# Patient Record
Sex: FEMALE | Race: BLACK OR AFRICAN AMERICAN | Employment: FULL TIME | URBAN - METROPOLITAN AREA
[De-identification: names, ages, dates, MRNs, and addresses within clinical notes are randomized per-mention and may not be internally consistent; named-entity substitution may affect disease eponyms.]

---

## 2019-06-20 ENCOUNTER — HOSPITAL ENCOUNTER (OUTPATIENT)
Age: 24
Discharge: HOME | End: 2019-06-20

## 2019-06-20 LAB — RUBV IGG SER QL: 499 IU/ML

## 2019-06-20 PROCEDURE — 86481 TB AG RESPONSE T-CELL SUSP: CPT

## 2019-06-20 PROCEDURE — 86787 VARICELLA-ZOSTER ANTIBODY: CPT

## 2019-06-20 PROCEDURE — 86735 MUMPS ANTIBODY: CPT

## 2019-06-20 PROCEDURE — 86765 RUBEOLA ANTIBODY: CPT

## 2019-06-20 PROCEDURE — 86762 RUBELLA ANTIBODY: CPT

## 2019-06-21 LAB
MEASLES IMMUNE (IGG): 5.16
MUV IGG SER QL: 1.85
VZV IGG SER QL IA: 3.01

## 2019-06-25 LAB — T-SPOT TB TEST: NORMAL

## 2019-09-06 ENCOUNTER — NURSE TRIAGE (OUTPATIENT)
Dept: OTHER | Facility: CLINIC | Age: 24
End: 2019-09-06

## 2019-09-06 NOTE — TELEPHONE ENCOUNTER
Reason for Disposition   Patient wants to be seen    Protocols used: HEADACHE-ADULT-OH    Patient states she has a migraine. States she was previously diagnosed and was supposed to see neurology but has not. She does not have a PCP at this time and is new to [facility].   She started with headache on one side of head this morning but is progressively getting a little worse, pain 6/10. She has taken Acetaminophen , she is \"allergic\" to Ibuprofen. She is calling to find out if she can to [facility] in [place]. Discussed that urgent care is not in network. Discussed [facility] in clinics near her or the [facility] [address] urgent care as another option.

## 2019-09-25 ENCOUNTER — NURSE TRIAGE (OUTPATIENT)
Dept: OTHER | Facility: CLINIC | Age: 24
End: 2019-09-25

## 2019-12-20 ENCOUNTER — TELEPHONE (OUTPATIENT)
Dept: OBGYN CLINIC | Age: 24
End: 2019-12-20

## 2020-01-02 ENCOUNTER — HOSPITAL ENCOUNTER (OUTPATIENT)
Age: 25
Setting detail: SPECIMEN
Discharge: HOME | End: 2020-01-02
Payer: COMMERCIAL

## 2020-01-02 ENCOUNTER — INITIAL PRENATAL (OUTPATIENT)
Dept: OBGYN CLINIC | Age: 25
End: 2020-01-02

## 2020-01-02 VITALS
SYSTOLIC BLOOD PRESSURE: 125 MMHG | HEIGHT: 69 IN | BODY MASS INDEX: 30.96 KG/M2 | DIASTOLIC BLOOD PRESSURE: 74 MMHG | WEIGHT: 209 LBS | HEART RATE: 67 BPM

## 2020-01-02 PROBLEM — Z23 NEEDS FLU SHOT: Status: ACTIVE | Noted: 2020-01-02

## 2020-01-02 PROBLEM — O99.210 OBESITY AFFECTING PREGNANCY: Status: ACTIVE | Noted: 2020-01-02

## 2020-01-02 PROBLEM — O09.299 HISTORY OF MACROSOMIA IN INFANT IN PRIOR PREGNANCY, CURRENTLY PREGNANT: Status: ACTIVE | Noted: 2020-01-02

## 2020-01-02 PROBLEM — O09.90 HIGH-RISK PREGNANCY: Status: ACTIVE | Noted: 2020-01-02

## 2020-01-02 PROCEDURE — 0502F SUBSEQUENT PRENATAL CARE: CPT | Performed by: ADVANCED PRACTICE MIDWIFE

## 2020-01-02 RX ORDER — FAMOTIDINE 40 MG/1
40 TABLET, FILM COATED ORAL PRN
COMMUNITY
End: 2022-08-30

## 2020-01-02 NOTE — PROGRESS NOTES
[address],  [place] [address] [address] [facility] [address]  Dept: [phone]    New Obstetric Intake     Subjective:    Patient Fernanda Fletcher  Patient Age: 21 y.o. Date of Visit: 1/2/2020  Patient's estimated gestational age at visit: 18w1d      Objective:  /74   Pulse 67   Ht 5' 8.5\" (1.74 m)   Wt 209 lb (94.8 kg)   BMI 31.32 kg/m²   Body mass index is 31.32 kg/m². Physical Exam  Vitals signs reviewed. Constitutional:       General: She is not in acute distress. Appearance: She is well-developed. She is not diaphoretic. Neck:      Musculoskeletal: Normal range of motion. Thyroid: No thyromegaly. Cardiovascular:      Rate and Rhythm: Normal rate and regular rhythm. Pulmonary:      Effort: Pulmonary effort is normal. No respiratory distress. Breath sounds: Normal breath sounds. Musculoskeletal: Normal range of motion. Skin:     General: Skin is warm and dry. Neurological:      Mental Status: She is alert and oriented to person, place, and time. Psychiatric:         Behavior: Behavior normal.         Thought Content: Thought content normal.         Judgment: Judgment normal.         Mother's Ethnicity: -American  Father's Ethnicity: -American    [facility] Score: 15  History of postpartum depression: no    Assessment/Plan:    1. High-risk pregnancy in second trimester  - [facility] referral for anatomy scan sent. - I agree with EPDS patient appears to not be accepting of the pregnancy. Stating her children do not know, saying she did tell them then said 'just joking'. Reports significant other is not happy nor upset about the pregnancy. Patient exhibited NO suicidal/ homicidal ideations. Reviewed patient does not desire further pregnancies but has tried multiple methods of contraception without success. Patient reports she does not know what she is going to do after this pregnancy.  Will continue to monitor closely in the pregnancy  - Jean Pierre Jesus MD, Maternal Fetal Medicine, [facility]    2. 18 weeks gestation of pregnancy   I have reviewed the RN OB intake visit and information   The patient was then seen and a full history was reviewed and completed. As was the above documented Physical Exam   After review of information, I initiated the Problem List    The patient was counseled on drug screening, HIV, Cystic Fibrosis, SMA, FX, and Sickle Cell disease, as appropriate.   o She verbally consented to HIV testing and drug screening.  The patient was informed of a 2-4% risk of congenital anomalies in the general population. The patient was questioned in detail regarding any genetic misnomer history, chromosomal abnormalities, or learning disabilities in herself, the father of the baby or their families. She denied any history as stated above: Yes   Nuchal Translucency/Quad Screen and/or Single Marker MSAFP testing was reviewed with attention to timing.  She declined genetic testing.  Route of delivery and counseling on vaginal, operative vaginal, and  sections were discussed. Further counseling will be handled by the provider at subsequent visits.  The patient was informed that [facility] only delivers at  [facility] and is agreeable to delivery at [facility].  She was made aware of the Midwife Philosophy against Elective Induction. 3. Vaginal discharge  - To f/up with results   - Vaginitis DNA Probe; Future  - Urine Culture; Future  - C.trachomatis N.gonorrhoeae DNA; Future        ROS was done and is negative except as documented in HPI. History was reviewed as documented on Epic Navigator. Patient was seen with total face to face time of 25 minutes. More than 50% of this visit was on counseling and education regarding her diagnoses and her options.  She was also counseled on her preventative health maintenance recommendations and

## 2020-01-03 ENCOUNTER — TELEPHONE (OUTPATIENT)
Dept: OBGYN CLINIC | Age: 25
End: 2020-01-03

## 2020-01-03 PROBLEM — Z91.89 AT RISK FOR DEPRESSION: Status: ACTIVE | Noted: 2020-01-03

## 2020-01-03 LAB
C TRACH DNA GENITAL QL NAA+PROBE: NEGATIVE
CULTURE: NORMAL
DIRECT EXAM: ABNORMAL
Lab: ABNORMAL
Lab: NORMAL
N. GONORRHOEAE DNA: NEGATIVE
SPECIMEN DESCRIPTION: ABNORMAL
SPECIMEN DESCRIPTION: NORMAL
SPECIMEN DESCRIPTION: NORMAL

## 2020-01-03 RX ORDER — METRONIDAZOLE 500 MG/1
500 TABLET ORAL 2 TIMES DAILY
Qty: 14 TABLET | Refills: 0 | Status: SHIPPED | OUTPATIENT
Start: 2020-01-03 | End: 2020-01-10

## 2020-01-16 ENCOUNTER — ROUTINE PRENATAL (OUTPATIENT)
Dept: OBGYN CLINIC | Age: 25
End: 2020-01-16

## 2020-01-16 VITALS
HEART RATE: 72 BPM | BODY MASS INDEX: 32.28 KG/M2 | SYSTOLIC BLOOD PRESSURE: 126 MMHG | WEIGHT: 215.4 LBS | DIASTOLIC BLOOD PRESSURE: 78 MMHG

## 2020-01-16 PROBLEM — F41.1 GAD (GENERALIZED ANXIETY DISORDER): Status: ACTIVE | Noted: 2020-01-16

## 2020-01-16 PROCEDURE — 0502F SUBSEQUENT PRENATAL CARE: CPT | Performed by: ADVANCED PRACTICE MIDWIFE

## 2020-01-16 RX ORDER — OMEPRAZOLE 20 MG/1
20 CAPSULE, DELAYED RELEASE ORAL DAILY
Qty: 60 CAPSULE | Refills: 6 | Status: SHIPPED | OUTPATIENT
Start: 2020-01-16 | End: 2020-07-01

## 2020-01-16 ASSESSMENT — PATIENT HEALTH QUESTIONNAIRE - PHQ9
2. FEELING DOWN, DEPRESSED OR HOPELESS: 0
SUM OF ALL RESPONSES TO PHQ9 QUESTIONS 1 & 2: 0
1. LITTLE INTEREST OR PLEASURE IN DOING THINGS: 0
SUM OF ALL RESPONSES TO PHQ QUESTIONS 1-9: 0
SUM OF ALL RESPONSES TO PHQ QUESTIONS 1-9: 0

## 2020-01-29 ENCOUNTER — ROUTINE PRENATAL (OUTPATIENT)
Dept: PERINATAL CARE | Age: 25
End: 2020-01-29
Payer: COMMERCIAL

## 2020-01-29 VITALS
SYSTOLIC BLOOD PRESSURE: 117 MMHG | HEIGHT: 69 IN | TEMPERATURE: 98.9 F | RESPIRATION RATE: 16 BRPM | DIASTOLIC BLOOD PRESSURE: 69 MMHG | BODY MASS INDEX: 32.73 KG/M2 | HEART RATE: 83 BPM | WEIGHT: 221 LBS

## 2020-01-29 PROCEDURE — 76811 OB US DETAILED SNGL FETUS: CPT | Performed by: OBSTETRICS & GYNECOLOGY

## 2020-01-29 PROCEDURE — 76817 TRANSVAGINAL US OBSTETRIC: CPT | Performed by: OBSTETRICS & GYNECOLOGY

## 2020-01-29 PROCEDURE — 99999 PR OFFICE/OUTPT VISIT,PROCEDURE ONLY: CPT | Performed by: OBSTETRICS & GYNECOLOGY

## 2020-01-29 RX ORDER — ACETAMINOPHEN 325 MG/1
650 TABLET ORAL EVERY 6 HOURS PRN
COMMUNITY

## 2020-02-18 ENCOUNTER — HOSPITAL ENCOUNTER (OUTPATIENT)
Age: 25
Setting detail: SPECIMEN
Discharge: HOME | End: 2020-02-18
Payer: COMMERCIAL

## 2020-02-18 ENCOUNTER — ROUTINE PRENATAL (OUTPATIENT)
Dept: OBGYN CLINIC | Age: 25
End: 2020-02-18

## 2020-02-18 VITALS
HEART RATE: 109 BPM | WEIGHT: 227 LBS | BODY MASS INDEX: 33.52 KG/M2 | SYSTOLIC BLOOD PRESSURE: 137 MMHG | DIASTOLIC BLOOD PRESSURE: 76 MMHG

## 2020-02-18 PROBLEM — O99.810 ABNORMAL GLUCOSE AFFECTING PREGNANCY: Status: ACTIVE | Noted: 2020-02-18

## 2020-02-18 LAB
ABSOLUTE EOS #: <0.03 K/UL (ref 0–0.44)
ABSOLUTE IMMATURE GRANULOCYTE: 0.05 K/UL (ref 0–0.3)
ABSOLUTE LYMPH #: 1.94 K/UL (ref 1.1–3.7)
ABSOLUTE MONO #: 0.39 K/UL (ref 0.1–1.2)
BASOPHILS # BLD: 0 % (ref 0–2)
BASOPHILS ABSOLUTE: <0.03 K/UL (ref 0–0.2)
BILIRUBIN, POC: NORMAL
BLOOD URINE, POC: NEGATIVE
CLARITY, POC: CLEAR
COLOR, POC: CLEAR
DIFFERENTIAL TYPE: ABNORMAL
DIRECT EXAM: NORMAL
EOSINOPHILS RELATIVE PERCENT: 0 % (ref 1–4)
GLUCOSE ADMINISTRATION: ABNORMAL
GLUCOSE TOLERANCE SCREEN 50G: 150 MG/DL (ref 70–135)
GLUCOSE URINE, POC: NORMAL
HCT VFR BLD CALC: 34.4 % (ref 36.3–47.1)
HEMOGLOBIN: 10.7 G/DL (ref 11.9–15.1)
IMMATURE GRANULOCYTES: 1 %
KETONES, POC: NEGATIVE
LEUKOCYTE EST, POC: NEGATIVE
LYMPHOCYTES # BLD: 23 % (ref 24–43)
Lab: NORMAL
MCH RBC QN AUTO: 26.5 PG (ref 25.2–33.5)
MCHC RBC AUTO-ENTMCNC: 31.1 G/DL (ref 28.4–34.8)
MCV RBC AUTO: 85.1 FL (ref 82.6–102.9)
MONOCYTES # BLD: 5 % (ref 3–12)
NITRITE, POC: NEGATIVE
NRBC AUTOMATED: 0 PER 100 WBC
PDW BLD-RTO: 15.1 % (ref 11.8–14.4)
PH, POC: 7
PLATELET # BLD: 287 K/UL (ref 138–453)
PLATELET ESTIMATE: ABNORMAL
PMV BLD AUTO: 9.7 FL (ref 8.1–13.5)
PROTEIN, POC: NEGATIVE
RBC # BLD: 4.04 M/UL (ref 3.95–5.11)
RBC # BLD: ABNORMAL 10*6/UL
SEG NEUTROPHILS: 71 % (ref 36–65)
SEGMENTED NEUTROPHILS ABSOLUTE COUNT: 6.17 K/UL (ref 1.5–8.1)
SPECIFIC GRAVITY, POC: 1
SPECIMEN DESCRIPTION: NORMAL
UROBILINOGEN, POC: NORMAL
WBC # BLD: 8.6 K/UL (ref 3.5–11.3)
WBC # BLD: ABNORMAL 10*3/UL

## 2020-02-18 PROCEDURE — 0502F SUBSEQUENT PRENATAL CARE: CPT | Performed by: ADVANCED PRACTICE MIDWIFE

## 2020-02-19 LAB
C TRACH DNA GENITAL QL NAA+PROBE: NEGATIVE
CULTURE: NORMAL
Lab: NORMAL
N. GONORRHOEAE DNA: NEGATIVE
SPECIMEN DESCRIPTION: NORMAL
SPECIMEN DESCRIPTION: NORMAL

## 2020-02-20 ENCOUNTER — HOSPITAL ENCOUNTER (OUTPATIENT)
Age: 25
Setting detail: SPECIMEN
Discharge: HOME | End: 2020-02-20
Payer: COMMERCIAL

## 2020-02-20 ENCOUNTER — TELEPHONE (OUTPATIENT)
Dept: OBGYN CLINIC | Age: 25
End: 2020-02-20

## 2020-02-20 LAB
3 HR GLUCOSE: 80 MG/DL (ref 65–139)
AMOUNT GLUCOSE GIVEN: 100 G
GLUCOSE FASTING: 78 MG/DL (ref 65–94)
GLUCOSE TOLERANCE TEST 1 HOUR: 85 MG/DL (ref 65–179)
GLUCOSE TOLERANCE TEST 2 HOUR: 84 MG/DL (ref 65–154)

## 2020-03-11 ENCOUNTER — ROUTINE PRENATAL (OUTPATIENT)
Dept: PERINATAL CARE | Age: 25
End: 2020-03-11
Payer: COMMERCIAL

## 2020-03-11 VITALS
HEIGHT: 69 IN | DIASTOLIC BLOOD PRESSURE: 65 MMHG | SYSTOLIC BLOOD PRESSURE: 111 MMHG | HEART RATE: 86 BPM | TEMPERATURE: 98.8 F | WEIGHT: 238.1 LBS | BODY MASS INDEX: 35.27 KG/M2 | RESPIRATION RATE: 16 BRPM

## 2020-03-11 PROCEDURE — 76827 ECHO EXAM OF FETAL HEART: CPT | Performed by: OBSTETRICS & GYNECOLOGY

## 2020-03-11 PROCEDURE — 76825 ECHO EXAM OF FETAL HEART: CPT | Performed by: OBSTETRICS & GYNECOLOGY

## 2020-03-11 PROCEDURE — 76816 OB US FOLLOW-UP PER FETUS: CPT | Performed by: OBSTETRICS & GYNECOLOGY

## 2020-03-11 PROCEDURE — 76819 FETAL BIOPHYS PROFIL W/O NST: CPT | Performed by: OBSTETRICS & GYNECOLOGY

## 2020-03-11 PROCEDURE — 93325 DOPPLER ECHO COLOR FLOW MAPG: CPT | Performed by: OBSTETRICS & GYNECOLOGY

## 2020-03-17 ENCOUNTER — ROUTINE PRENATAL (OUTPATIENT)
Dept: OBGYN CLINIC | Age: 25
End: 2020-03-17
Payer: COMMERCIAL

## 2020-03-17 VITALS
WEIGHT: 235.2 LBS | BODY MASS INDEX: 34.73 KG/M2 | HEART RATE: 85 BPM | SYSTOLIC BLOOD PRESSURE: 127 MMHG | DIASTOLIC BLOOD PRESSURE: 74 MMHG

## 2020-03-17 PROCEDURE — 90471 IMMUNIZATION ADMIN: CPT | Performed by: ADVANCED PRACTICE MIDWIFE

## 2020-03-17 PROCEDURE — 90715 TDAP VACCINE 7 YRS/> IM: CPT | Performed by: ADVANCED PRACTICE MIDWIFE

## 2020-03-17 PROCEDURE — 0502F SUBSEQUENT PRENATAL CARE: CPT | Performed by: ADVANCED PRACTICE MIDWIFE

## 2020-03-17 NOTE — PROGRESS NOTES
After obtaining consent, and per orders of Kem Homans, CNM, injection of Tdap given in Left deltoid by Shree Alert. Patient instructed to remain in clinic for 20 minutes afterwards, and to report any adverse reaction to me immediately.

## 2020-03-25 ENCOUNTER — TELEPHONE (OUTPATIENT)
Dept: OBGYN CLINIC | Age: 25
End: 2020-03-25

## 2020-03-25 ENCOUNTER — TELEPHONE (OUTPATIENT)
Dept: PEDIATRICS CLINIC | Age: 25
End: 2020-03-25

## 2020-03-25 NOTE — TELEPHONE ENCOUNTER
PC from the patient stating she need to have an end date for her FMLA for the light duty. Please advise.

## 2020-03-25 NOTE — LETTER
[facility]) [facility] and Gynecology   [address] [place] [address]  Phone: [phone]  Fax: [phone]    Cheryle Marc, APRN - CNM        March 25, 2020     Patient: Geovanna Rooney   YOB: 1995   Date of Visit: 3/25/2020       To Whom it May Concern: It is my medical opinion that Geovanna Rooney may return to work on light duty (lifting restriction of 15 pounds) for the remainder of her pregnancy. Her estimated due date is 06/03/2020. If you have any questions or concerns, please don't hesitate to call.     Sincerely,         Cheryle Marc, APRN - CNM

## 2020-04-02 ENCOUNTER — TELEPHONE (OUTPATIENT)
Dept: OBGYN CLINIC | Age: 25
End: 2020-04-02

## 2020-04-02 ENCOUNTER — ROUTINE PRENATAL (OUTPATIENT)
Dept: OBGYN CLINIC | Age: 25
End: 2020-04-02

## 2020-04-02 VITALS
SYSTOLIC BLOOD PRESSURE: 137 MMHG | WEIGHT: 239 LBS | HEART RATE: 101 BPM | DIASTOLIC BLOOD PRESSURE: 76 MMHG | BODY MASS INDEX: 35.29 KG/M2

## 2020-04-02 PROBLEM — Z23 NEED FOR TDAP VACCINATION: Status: ACTIVE | Noted: 2020-04-02

## 2020-04-02 PROCEDURE — 0502F SUBSEQUENT PRENATAL CARE: CPT | Performed by: ADVANCED PRACTICE MIDWIFE

## 2020-04-02 NOTE — TELEPHONE ENCOUNTER
ROXANNA for pt to call back to schedule appt Virtual visit  4-23-20 @34w,and May7 @36w at our [facility]

## 2020-04-06 ENCOUNTER — TELEPHONE (OUTPATIENT)
Dept: OBGYN | Age: 25
End: 2020-04-06

## 2020-04-06 ENCOUNTER — TELEPHONE (OUTPATIENT)
Dept: OBGYN CLINIC | Age: 25
End: 2020-04-06

## 2020-04-17 ENCOUNTER — TELEPHONE (OUTPATIENT)
Dept: OBGYN CLINIC | Age: 25
End: 2020-04-17

## 2020-04-17 NOTE — TELEPHONE ENCOUNTER
Patient does not understand why she has to wait so late into the pregnancy. She stated she was told it was to see if she was going to be able to deliver vaginally or have to have a . Patient states that she needs to get it scheduled now she states because she always delivers early. I let her know that we an put in the referral at 35-36 weeks and schedule it for her then.

## 2020-04-17 NOTE — TELEPHONE ENCOUNTER
Patient called and stated that a referral for a check on baby's size was supposed to be put in son she could get a ultrasound. She was told by margy that a referral was getting put in she said.      I do not see a referral for ultrasound

## 2020-04-27 ENCOUNTER — HOSPITAL ENCOUNTER (OUTPATIENT)
Age: 25
Setting detail: SPECIMEN
Discharge: HOME | End: 2020-04-27
Payer: COMMERCIAL

## 2020-04-27 ENCOUNTER — ROUTINE PRENATAL (OUTPATIENT)
Dept: OBGYN CLINIC | Age: 25
End: 2020-04-27

## 2020-04-27 VITALS
BODY MASS INDEX: 36.62 KG/M2 | DIASTOLIC BLOOD PRESSURE: 79 MMHG | WEIGHT: 248 LBS | SYSTOLIC BLOOD PRESSURE: 135 MMHG | HEART RATE: 94 BPM | TEMPERATURE: 97.7 F

## 2020-04-27 LAB
ALBUMIN SERPL-MCNC: 3.5 G/DL (ref 3.5–5.2)
ALBUMIN/GLOBULIN RATIO: 1.2 (ref 1–2.5)
ALP BLD-CCNC: 90 U/L (ref 35–104)
ALT SERPL-CCNC: 15 U/L (ref 5–33)
ANION GAP SERPL CALCULATED.3IONS-SCNC: 16 MMOL/L (ref 9–17)
AST SERPL-CCNC: 18 U/L
BILIRUB SERPL-MCNC: 0.22 MG/DL (ref 0.3–1.2)
BUN BLDV-MCNC: 3 MG/DL (ref 6–20)
BUN/CREAT BLD: ABNORMAL (ref 9–20)
CALCIUM SERPL-MCNC: 9.7 MG/DL (ref 8.6–10.4)
CHLORIDE BLD-SCNC: 100 MMOL/L (ref 98–107)
CO2: 22 MMOL/L (ref 20–31)
CREAT SERPL-MCNC: 0.35 MG/DL (ref 0.5–0.9)
CREATININE URINE: 42.5 MG/DL (ref 28–217)
GFR AFRICAN AMERICAN: >60 ML/MIN
GFR NON-AFRICAN AMERICAN: >60 ML/MIN
GFR SERPL CREATININE-BSD FRML MDRD: ABNORMAL ML/MIN/{1.73_M2}
GFR SERPL CREATININE-BSD FRML MDRD: ABNORMAL ML/MIN/{1.73_M2}
GLUCOSE BLD-MCNC: 104 MG/DL (ref 70–99)
POTASSIUM SERPL-SCNC: 4.2 MMOL/L (ref 3.7–5.3)
SODIUM BLD-SCNC: 138 MMOL/L (ref 135–144)
TOTAL PROTEIN, URINE: 8 MG/DL
TOTAL PROTEIN: 6.5 G/DL (ref 6.4–8.3)
URIC ACID: 4.8 MG/DL (ref 2.4–5.7)
URINE TOTAL PROTEIN CREATININE RATIO: 0.19 (ref 0–0.2)

## 2020-04-27 PROCEDURE — 0502F SUBSEQUENT PRENATAL CARE: CPT | Performed by: ADVANCED PRACTICE MIDWIFE

## 2020-04-27 NOTE — PROGRESS NOTES
SUBJECTIVE:    Monica Negro is here for her routine OB visit. She reports  fetal movement. She denies  vaginal bleeding. She denies  leaking of fluid. She denies  vaginal discharge. She reports some  uterine contraction activity. She denies  nausea and/or vomiting. She denies retaining fluid in her extremities. Here today, not feeling well. Nauseated this am and vomited once, tired. Seeing floaters. Watches cardiac monitors by telehealth. Also c/o headaches. States her B/P has been elevated at home 140s/80s  Labs drawn for PE baseline. Order given for growth ultrasound for 36 weeks. OBJECTIVE:   Blood pressure 135/79, pulse 94, temperature 97.7 °F (36.5 °C), temperature source Oral, weight 248 lb (112.5 kg). ASSESSMENT:  IUP @ 34 weeks  S = D      PLAN:   Monica Negro will monitor for fetal movements daily. The problem list was reviewed and updated as needed. GBS protocol and testing was not discussed. GBS culture was not obtained. Results were not reviewed. Signs of labor to report were discussed. Birth preferences were discussed. Monica Negro was not counseled regarding Post Partum Depression. She has not decided on contraceptive choice. Monica Negro was counseled regarding cHTN and S? S of PE     More than 50% of this 20 min visit was on counseling and education.

## 2020-04-28 ENCOUNTER — ROUTINE PRENATAL (OUTPATIENT)
Dept: PERINATAL CARE | Age: 25
End: 2020-04-28
Payer: COMMERCIAL

## 2020-04-28 VITALS
HEART RATE: 92 BPM | HEIGHT: 69 IN | WEIGHT: 249 LBS | DIASTOLIC BLOOD PRESSURE: 68 MMHG | RESPIRATION RATE: 16 BRPM | BODY MASS INDEX: 36.88 KG/M2 | TEMPERATURE: 98.4 F | SYSTOLIC BLOOD PRESSURE: 124 MMHG

## 2020-04-28 PROCEDURE — 76819 FETAL BIOPHYS PROFIL W/O NST: CPT | Performed by: OBSTETRICS & GYNECOLOGY

## 2020-04-28 PROCEDURE — 76805 OB US >/= 14 WKS SNGL FETUS: CPT | Performed by: OBSTETRICS & GYNECOLOGY

## 2020-05-04 ENCOUNTER — HOSPITAL ENCOUNTER (OUTPATIENT)
Age: 25
Setting detail: SPECIMEN
Discharge: HOME | End: 2020-05-04
Payer: COMMERCIAL

## 2020-05-04 ENCOUNTER — ROUTINE PRENATAL (OUTPATIENT)
Dept: OBGYN CLINIC | Age: 25
End: 2020-05-04

## 2020-05-04 VITALS — BODY MASS INDEX: 37.51 KG/M2 | DIASTOLIC BLOOD PRESSURE: 78 MMHG | WEIGHT: 254 LBS | SYSTOLIC BLOOD PRESSURE: 128 MMHG

## 2020-05-04 LAB
DIRECT EXAM: ABNORMAL
HCT VFR BLD CALC: 34.4 % (ref 36.3–47.1)
HEMOGLOBIN: 10.2 G/DL (ref 11.9–15.1)
Lab: ABNORMAL
MCH RBC QN AUTO: 26.5 PG (ref 25.2–33.5)
MCHC RBC AUTO-ENTMCNC: 29.7 G/DL (ref 28.4–34.8)
MCV RBC AUTO: 89.4 FL (ref 82.6–102.9)
NRBC AUTOMATED: 0 PER 100 WBC
PDW BLD-RTO: 14.8 % (ref 11.8–14.4)
PLATELET # BLD: 164 K/UL (ref 138–453)
PMV BLD AUTO: 10.2 FL (ref 8.1–13.5)
RBC # BLD: 3.85 M/UL (ref 3.95–5.11)
SPECIMEN DESCRIPTION: ABNORMAL
WBC # BLD: 6.5 K/UL (ref 3.5–11.3)

## 2020-05-04 PROCEDURE — 0502F SUBSEQUENT PRENATAL CARE: CPT | Performed by: ADVANCED PRACTICE MIDWIFE

## 2020-05-04 NOTE — PROGRESS NOTES
SUBJECTIVE:    Lito Arvizu is here for her routine OB visit. She is doing well but is concerned with big babies. She is only one in family with big babies but FOB was macrosomic baby also. Denies having any issues with deliveries. She reports  fetal movement. She denies  vaginal bleeding. She denies  leaking of fluid. She reports vaginal discharge. She denies  uterine contraction activity. She denies  nausea and/or vomiting. She denies retaining fluid in her extremities. OBJECTIVE:   Blood pressure 128/78, weight 254 lb (115.2 kg), last menstrual period 08/17/2019. GBS and Affirm self-collected      ASSESSMENT/PLAN:  1. 35 weeks gestation of pregnancy  S>D      2. High-risk pregnancy in third trimester  The problem list was reviewed and updated as needed. Lito Arvizu will monitor for fetal movements daily    GBS protocol and testing was discussed. GBS culture was obtained. Signs of labor to report were discussed. Birth preferences were discussed. Off work started    Lito Arvizu was counseled regarding all of the above    3. History of macrosomia in infant in prior pregnancy, currently pregnant  Reassurance for delivery given, as she has delivered macrosomic babies prior    4. Vaginal discharge  - Vaginitis DNA Probe; Future    5. Anemia due to other cause, not classified  - CBC; Future      Patient was seen with total faced to face time of 15 minutes. More than 50% of this visit was on counseling and education.

## 2020-05-04 NOTE — LETTER
[facility] and Gynecology   [address] [place] [address] Drive [address] [place] [address]  Phone: [phone]  Fax: [phone]    BRENDAN Naylor CNM        May 4, 2020    [address]  [place] [address]      To Whom It May Concern:    Theo Escobar (1995) was seen in our clinic today. It is my medical recommendation that she be off work for the duration of her pregnancy starting 05/04/2020. If you have any questions or concerns, please don't hesitate to call.     Sincerely,        BRENDAN Naylor CNM

## 2020-05-06 PROBLEM — O99.820 GBS (GROUP B STREPTOCOCCUS CARRIER), +RV CULTURE, CURRENTLY PREGNANT: Status: ACTIVE | Noted: 2020-05-06

## 2020-05-06 LAB
CULTURE: ABNORMAL
Lab: ABNORMAL
SPECIMEN DESCRIPTION: ABNORMAL

## 2020-05-06 RX ORDER — METRONIDAZOLE 500 MG/1
500 TABLET ORAL 2 TIMES DAILY
Qty: 14 TABLET | Refills: 0 | Status: SHIPPED | OUTPATIENT
Start: 2020-05-06 | End: 2020-05-13

## 2020-05-11 ENCOUNTER — ROUTINE PRENATAL (OUTPATIENT)
Dept: OBGYN CLINIC | Age: 25
End: 2020-05-11

## 2020-05-11 VITALS
BODY MASS INDEX: 38.99 KG/M2 | HEART RATE: 92 BPM | SYSTOLIC BLOOD PRESSURE: 140 MMHG | WEIGHT: 264 LBS | DIASTOLIC BLOOD PRESSURE: 89 MMHG

## 2020-05-11 PROCEDURE — 0502F SUBSEQUENT PRENATAL CARE: CPT | Performed by: ADVANCED PRACTICE MIDWIFE

## 2020-05-18 ENCOUNTER — ROUTINE PRENATAL (OUTPATIENT)
Dept: OBGYN CLINIC | Age: 25
End: 2020-05-18

## 2020-05-18 VITALS
HEART RATE: 90 BPM | WEIGHT: 276 LBS | DIASTOLIC BLOOD PRESSURE: 80 MMHG | BODY MASS INDEX: 40.76 KG/M2 | SYSTOLIC BLOOD PRESSURE: 124 MMHG

## 2020-05-18 PROCEDURE — 0502F SUBSEQUENT PRENATAL CARE: CPT | Performed by: ADVANCED PRACTICE MIDWIFE

## 2020-05-19 ENCOUNTER — HOSPITAL ENCOUNTER (INPATIENT)
Age: 25
LOS: 2 days | Discharge: HOME | End: 2020-05-21
Attending: OBSTETRICS & GYNECOLOGY | Admitting: OBSTETRICS & GYNECOLOGY
Payer: COMMERCIAL

## 2020-05-19 ENCOUNTER — ANESTHESIA EVENT (OUTPATIENT)
Dept: LABOR AND DELIVERY | Age: 25
End: 2020-05-19
Payer: COMMERCIAL

## 2020-05-19 ENCOUNTER — ANESTHESIA (OUTPATIENT)
Dept: LABOR AND DELIVERY | Age: 25
End: 2020-05-19
Payer: COMMERCIAL

## 2020-05-19 PROBLEM — O47.9 UTERINE CONTRACTIONS DURING PREGNANCY: Status: ACTIVE | Noted: 2020-05-19

## 2020-05-19 PROBLEM — D64.9 ANEMIA: Status: ACTIVE | Noted: 2020-05-19

## 2020-05-19 PROBLEM — O09.891 MATERNAL EXPOSURE TO ALCOHOL IN FIRST TRIMESTER: Status: ACTIVE | Noted: 2020-05-19

## 2020-05-19 PROBLEM — O14.90 PREECLAMPSIA: Status: ACTIVE | Noted: 2020-05-19

## 2020-05-19 PROBLEM — Z37.9 VACUUM-ASSISTED VAGINAL DELIVERY: Status: ACTIVE | Noted: 2020-05-19

## 2020-05-19 PROBLEM — Z3A.37 37 WEEKS GESTATION OF PREGNANCY: Status: ACTIVE | Noted: 2020-05-19

## 2020-05-19 PROBLEM — Z67.90 RH(D) POSITIVE: Status: ACTIVE | Noted: 2020-05-19

## 2020-05-19 LAB
-: NORMAL
ABO/RH: NORMAL
ABSOLUTE EOS #: 0 K/UL (ref 0–0.4)
ABSOLUTE IMMATURE GRANULOCYTE: 0 K/UL (ref 0–0.3)
ABSOLUTE LYMPH #: 2.03 K/UL (ref 1–4.8)
ABSOLUTE MONO #: 0.16 K/UL (ref 0.1–0.8)
ALBUMIN SERPL-MCNC: 3 G/DL (ref 3.5–5.2)
ALBUMIN/GLOBULIN RATIO: 1 (ref 1–2.5)
ALP BLD-CCNC: 98 U/L (ref 35–104)
ALT SERPL-CCNC: 16 U/L (ref 5–33)
AMORPHOUS: NORMAL
AMPHETAMINE SCREEN URINE: NEGATIVE
ANION GAP SERPL CALCULATED.3IONS-SCNC: 15 MMOL/L (ref 9–17)
ANTIBODY SCREEN: NEGATIVE
ARM BAND NUMBER: NORMAL
AST SERPL-CCNC: 26 U/L
BACTERIA: NORMAL
BARBITURATE SCREEN URINE: NEGATIVE
BASOPHILS # BLD: 0 % (ref 0–2)
BASOPHILS ABSOLUTE: 0 K/UL (ref 0–0.2)
BENZODIAZEPINE SCREEN, URINE: NEGATIVE
BILIRUB SERPL-MCNC: 0.28 MG/DL (ref 0.3–1.2)
BILIRUBIN URINE: NEGATIVE
BUN BLDV-MCNC: 4 MG/DL (ref 6–20)
BUN/CREAT BLD: ABNORMAL (ref 9–20)
BUPRENORPHINE URINE: NORMAL
CALCIUM SERPL-MCNC: 9 MG/DL (ref 8.6–10.4)
CANNABINOID SCREEN URINE: NEGATIVE
CASTS UA: NORMAL /LPF (ref 0–8)
CHLORIDE BLD-SCNC: 105 MMOL/L (ref 98–107)
CO2: 18 MMOL/L (ref 20–31)
COCAINE METABOLITE, URINE: NEGATIVE
COLOR: YELLOW
COMMENT UA: ABNORMAL
CREAT SERPL-MCNC: 0.42 MG/DL (ref 0.5–0.9)
CREATININE URINE: 72.3 MG/DL (ref 28–217)
CRYSTALS, UA: NORMAL /HPF
DIFFERENTIAL TYPE: ABNORMAL
EOSINOPHILS RELATIVE PERCENT: 0 % (ref 1–4)
EPITHELIAL CELLS UA: NORMAL /HPF (ref 0–5)
EXPIRATION DATE: NORMAL
GFR AFRICAN AMERICAN: >60 ML/MIN
GFR NON-AFRICAN AMERICAN: >60 ML/MIN
GFR SERPL CREATININE-BSD FRML MDRD: ABNORMAL ML/MIN/{1.73_M2}
GFR SERPL CREATININE-BSD FRML MDRD: ABNORMAL ML/MIN/{1.73_M2}
GLUCOSE BLD-MCNC: 111 MG/DL (ref 70–99)
GLUCOSE URINE: NEGATIVE
HCT VFR BLD CALC: 30.7 % (ref 36.3–47.1)
HEMOGLOBIN: 9.5 G/DL (ref 11.9–15.1)
IMMATURE GRANULOCYTES: 0 %
KETONES, URINE: NEGATIVE
LEUKOCYTE ESTERASE, URINE: NEGATIVE
LYMPHOCYTES # BLD: 26 % (ref 24–44)
MAGNESIUM: 4 MG/DL (ref 1.6–2.6)
MCH RBC QN AUTO: 24.5 PG (ref 25.2–33.5)
MCHC RBC AUTO-ENTMCNC: 30.9 G/DL (ref 28.4–34.8)
MCV RBC AUTO: 79.3 FL (ref 82.6–102.9)
MDMA URINE: NORMAL
METHADONE SCREEN, URINE: NEGATIVE
METHAMPHETAMINE, URINE: NORMAL
MONOCYTES # BLD: 2 % (ref 1–7)
MORPHOLOGY: ABNORMAL
MORPHOLOGY: ABNORMAL
MUCUS: NORMAL
NITRITE, URINE: NEGATIVE
NRBC AUTOMATED: 0.5 PER 100 WBC
NUCLEATED RED BLOOD CELLS: 1 PER 100 WBC
OPIATES, URINE: NEGATIVE
OTHER OBSERVATIONS UA: NORMAL
OXYCODONE SCREEN URINE: NEGATIVE
PDW BLD-RTO: 14.1 % (ref 11.8–14.4)
PH UA: 7 (ref 5–8)
PHENCYCLIDINE, URINE: NEGATIVE
PLATELET # BLD: 192 K/UL (ref 138–453)
PLATELET ESTIMATE: ABNORMAL
PMV BLD AUTO: 9.7 FL (ref 8.1–13.5)
POTASSIUM SERPL-SCNC: 3.7 MMOL/L (ref 3.7–5.3)
PROPOXYPHENE, URINE: NORMAL
PROTEIN UA: ABNORMAL
RBC # BLD: 3.87 M/UL (ref 3.95–5.11)
RBC # BLD: ABNORMAL 10*6/UL
RBC UA: NORMAL /HPF (ref 0–4)
RENAL EPITHELIAL, UA: NORMAL /HPF
SARS-COV-2, PCR: NORMAL
SARS-COV-2, RAPID: NOT DETECTED
SARS-COV-2: NORMAL
SEG NEUTROPHILS: 72 % (ref 36–66)
SEGMENTED NEUTROPHILS ABSOLUTE COUNT: 5.61 K/UL (ref 1.8–7.7)
SODIUM BLD-SCNC: 138 MMOL/L (ref 135–144)
SOURCE: NORMAL
SPECIFIC GRAVITY UA: 1.01 (ref 1–1.03)
T. PALLIDUM, IGG: NONREACTIVE
TEST INFORMATION: NORMAL
TOTAL PROTEIN, URINE: 53 MG/DL
TOTAL PROTEIN: 6 G/DL (ref 6.4–8.3)
TRICHOMONAS: NORMAL
TRICYCLIC ANTIDEPRESSANTS, UR: NORMAL
TURBIDITY: CLEAR
URINE HGB: NEGATIVE
URINE TOTAL PROTEIN CREATININE RATIO: 0.73 (ref 0–0.2)
UROBILINOGEN, URINE: NORMAL
WBC # BLD: 7.8 K/UL (ref 3.5–11.3)
WBC # BLD: ABNORMAL 10*3/UL
WBC UA: NORMAL /HPF (ref 0–5)
YEAST: NORMAL

## 2020-05-19 PROCEDURE — 6360000002 HC RX W HCPCS: Performed by: STUDENT IN AN ORGANIZED HEALTH CARE EDUCATION/TRAINING PROGRAM

## 2020-05-19 PROCEDURE — 1220000000 HC SEMI PRIVATE OB R&B

## 2020-05-19 PROCEDURE — 2500000003 HC RX 250 WO HCPCS: Performed by: NURSE ANESTHETIST, CERTIFIED REGISTERED

## 2020-05-19 PROCEDURE — 85025 COMPLETE CBC W/AUTO DIFF WBC: CPT

## 2020-05-19 PROCEDURE — 2500000003 HC RX 250 WO HCPCS: Performed by: STUDENT IN AN ORGANIZED HEALTH CARE EDUCATION/TRAINING PROGRAM

## 2020-05-19 PROCEDURE — 51701 INSERT BLADDER CATHETER: CPT

## 2020-05-19 PROCEDURE — 2580000003 HC RX 258: Performed by: STUDENT IN AN ORGANIZED HEALTH CARE EDUCATION/TRAINING PROGRAM

## 2020-05-19 PROCEDURE — 2500000003 HC RX 250 WO HCPCS

## 2020-05-19 PROCEDURE — 36415 COLL VENOUS BLD VENIPUNCTURE: CPT

## 2020-05-19 PROCEDURE — 3E033VJ INTRODUCTION OF OTHER HORMONE INTO PERIPHERAL VEIN, PERCUTANEOUS APPROACH: ICD-10-PCS | Performed by: OBSTETRICS & GYNECOLOGY

## 2020-05-19 PROCEDURE — 80307 DRUG TEST PRSMV CHEM ANLYZR: CPT

## 2020-05-19 PROCEDURE — 86901 BLOOD TYPING SEROLOGIC RH(D): CPT

## 2020-05-19 PROCEDURE — 6370000000 HC RX 637 (ALT 250 FOR IP): Performed by: STUDENT IN AN ORGANIZED HEALTH CARE EDUCATION/TRAINING PROGRAM

## 2020-05-19 PROCEDURE — 86900 BLOOD TYPING SEROLOGIC ABO: CPT

## 2020-05-19 PROCEDURE — 7200000001 HC VAGINAL DELIVERY

## 2020-05-19 PROCEDURE — 81001 URINALYSIS AUTO W/SCOPE: CPT

## 2020-05-19 PROCEDURE — 86780 TREPONEMA PALLIDUM: CPT

## 2020-05-19 PROCEDURE — 82570 ASSAY OF URINE CREATININE: CPT

## 2020-05-19 PROCEDURE — U0002 COVID-19 LAB TEST NON-CDC: HCPCS

## 2020-05-19 PROCEDURE — 6360000002 HC RX W HCPCS: Performed by: NURSE ANESTHETIST, CERTIFIED REGISTERED

## 2020-05-19 PROCEDURE — 96366 THER/PROPH/DIAG IV INF ADDON: CPT

## 2020-05-19 PROCEDURE — 80053 COMPREHEN METABOLIC PANEL: CPT

## 2020-05-19 PROCEDURE — 6360000002 HC RX W HCPCS: Performed by: ANESTHESIOLOGY

## 2020-05-19 PROCEDURE — 84156 ASSAY OF PROTEIN URINE: CPT

## 2020-05-19 PROCEDURE — 6360000002 HC RX W HCPCS: Performed by: ADVANCED PRACTICE MIDWIFE

## 2020-05-19 PROCEDURE — 83735 ASSAY OF MAGNESIUM: CPT

## 2020-05-19 PROCEDURE — 88307 TISSUE EXAM BY PATHOLOGIST: CPT

## 2020-05-19 PROCEDURE — 86850 RBC ANTIBODY SCREEN: CPT

## 2020-05-19 PROCEDURE — 59400 OBSTETRICAL CARE: CPT | Performed by: ADVANCED PRACTICE MIDWIFE

## 2020-05-19 PROCEDURE — 96365 THER/PROPH/DIAG IV INF INIT: CPT

## 2020-05-19 PROCEDURE — 3700000025 EPIDURAL BLOCK: Performed by: ANESTHESIOLOGY

## 2020-05-19 PROCEDURE — 6360000002 HC RX W HCPCS

## 2020-05-19 RX ORDER — NIFEDIPINE 30 MG/1
30 TABLET, FILM COATED, EXTENDED RELEASE ORAL DAILY
Qty: 30 TABLET | Refills: 3 | Status: SHIPPED | OUTPATIENT
Start: 2020-05-20 | End: 2020-07-01 | Stop reason: ALTCHOICE

## 2020-05-19 RX ORDER — FENTANYL CITRATE 50 UG/ML
INJECTION, SOLUTION INTRAMUSCULAR; INTRAVENOUS PRN
Status: DISCONTINUED | OUTPATIENT
Start: 2020-05-19 | End: 2020-05-19 | Stop reason: SDUPTHER

## 2020-05-19 RX ORDER — NIFEDIPINE 30 MG/1
30 TABLET, EXTENDED RELEASE ORAL DAILY
Status: DISCONTINUED | OUTPATIENT
Start: 2020-05-19 | End: 2020-05-19

## 2020-05-19 RX ORDER — SODIUM CHLORIDE 0.9 % (FLUSH) 0.9 %
10 SYRINGE (ML) INJECTION EVERY 12 HOURS SCHEDULED
Status: DISCONTINUED | OUTPATIENT
Start: 2020-05-19 | End: 2020-05-21 | Stop reason: HOSPADM

## 2020-05-19 RX ORDER — LABETALOL HYDROCHLORIDE 5 MG/ML
20 INJECTION, SOLUTION INTRAVENOUS
Status: COMPLETED | OUTPATIENT
Start: 2020-05-19 | End: 2020-05-19

## 2020-05-19 RX ORDER — NALOXONE HYDROCHLORIDE 0.4 MG/ML
0.4 INJECTION, SOLUTION INTRAMUSCULAR; INTRAVENOUS; SUBCUTANEOUS PRN
Status: DISCONTINUED | OUTPATIENT
Start: 2020-05-19 | End: 2020-05-19 | Stop reason: HOSPADM

## 2020-05-19 RX ORDER — LANOLIN ALCOHOL/MO/W.PET/CERES
325 CREAM (GRAM) TOPICAL
Qty: 30 TABLET | Refills: 1 | Status: SHIPPED | OUTPATIENT
Start: 2020-05-19 | End: 2022-08-30

## 2020-05-19 RX ORDER — SODIUM CHLORIDE 0.9 % (FLUSH) 0.9 %
10 SYRINGE (ML) INJECTION PRN
Status: DISCONTINUED | OUTPATIENT
Start: 2020-05-19 | End: 2020-05-21 | Stop reason: HOSPADM

## 2020-05-19 RX ORDER — SODIUM CHLORIDE 0.9 % (FLUSH) 0.9 %
10 SYRINGE (ML) INJECTION EVERY 12 HOURS SCHEDULED
Status: DISCONTINUED | OUTPATIENT
Start: 2020-05-19 | End: 2020-05-19

## 2020-05-19 RX ORDER — DOCUSATE SODIUM 100 MG/1
100 CAPSULE, LIQUID FILLED ORAL 2 TIMES DAILY
Status: DISCONTINUED | OUTPATIENT
Start: 2020-05-19 | End: 2020-05-21 | Stop reason: HOSPADM

## 2020-05-19 RX ORDER — LANOLIN 100 %
OINTMENT (GRAM) TOPICAL PRN
Status: DISCONTINUED | OUTPATIENT
Start: 2020-05-19 | End: 2020-05-21 | Stop reason: HOSPADM

## 2020-05-19 RX ORDER — DIPHENHYDRAMINE HCL 25 MG
25 TABLET ORAL EVERY 4 HOURS PRN
Status: DISCONTINUED | OUTPATIENT
Start: 2020-05-19 | End: 2020-05-19 | Stop reason: HOSPADM

## 2020-05-19 RX ORDER — ONDANSETRON 2 MG/ML
4 INJECTION INTRAMUSCULAR; INTRAVENOUS EVERY 6 HOURS PRN
Status: DISCONTINUED | OUTPATIENT
Start: 2020-05-19 | End: 2020-05-19 | Stop reason: HOSPADM

## 2020-05-19 RX ORDER — LIDOCAINE HYDROCHLORIDE 10 MG/ML
INJECTION, SOLUTION EPIDURAL; INFILTRATION; INTRACAUDAL; PERINEURAL PRN
Status: DISCONTINUED | OUTPATIENT
Start: 2020-05-19 | End: 2020-05-19 | Stop reason: SDUPTHER

## 2020-05-19 RX ORDER — LIDOCAINE HYDROCHLORIDE AND EPINEPHRINE 15; 5 MG/ML; UG/ML
INJECTION, SOLUTION EPIDURAL PRN
Status: DISCONTINUED | OUTPATIENT
Start: 2020-05-19 | End: 2020-05-19 | Stop reason: SDUPTHER

## 2020-05-19 RX ORDER — ACETAMINOPHEN 500 MG
1000 TABLET ORAL EVERY 6 HOURS
Status: DISCONTINUED | OUTPATIENT
Start: 2020-05-19 | End: 2020-05-21 | Stop reason: HOSPADM

## 2020-05-19 RX ORDER — SODIUM CHLORIDE 0.9 % (FLUSH) 0.9 %
10 SYRINGE (ML) INJECTION PRN
Status: DISCONTINUED | OUTPATIENT
Start: 2020-05-19 | End: 2020-05-19

## 2020-05-19 RX ORDER — HYDRALAZINE HYDROCHLORIDE 20 MG/ML
10 INJECTION INTRAMUSCULAR; INTRAVENOUS
Status: ACTIVE | OUTPATIENT
Start: 2020-05-19 | End: 2020-05-19

## 2020-05-19 RX ORDER — NALBUPHINE HCL 10 MG/ML
5 AMPUL (ML) INJECTION EVERY 4 HOURS PRN
Status: DISCONTINUED | OUTPATIENT
Start: 2020-05-19 | End: 2020-05-19 | Stop reason: HOSPADM

## 2020-05-19 RX ORDER — ACETAMINOPHEN 500 MG
1000 TABLET ORAL EVERY 6 HOURS PRN
Status: DISCONTINUED | OUTPATIENT
Start: 2020-05-19 | End: 2020-05-19

## 2020-05-19 RX ORDER — ROPIVACAINE HYDROCHLORIDE 2 MG/ML
INJECTION, SOLUTION EPIDURAL; INFILTRATION; PERINEURAL
Status: COMPLETED
Start: 2020-05-19 | End: 2020-05-19

## 2020-05-19 RX ORDER — ACETAMINOPHEN 500 MG
1000 TABLET ORAL EVERY 6 HOURS PRN
Status: DISCONTINUED | OUTPATIENT
Start: 2020-05-19 | End: 2020-05-19 | Stop reason: HOSPADM

## 2020-05-19 RX ORDER — FENTANYL CITRATE 50 UG/ML
50 INJECTION, SOLUTION INTRAMUSCULAR; INTRAVENOUS ONCE
Status: COMPLETED | OUTPATIENT
Start: 2020-05-19 | End: 2020-05-19

## 2020-05-19 RX ORDER — SODIUM CHLORIDE, SODIUM LACTATE, POTASSIUM CHLORIDE, CALCIUM CHLORIDE 600; 310; 30; 20 MG/100ML; MG/100ML; MG/100ML; MG/100ML
INJECTION, SOLUTION INTRAVENOUS CONTINUOUS
Status: DISCONTINUED | OUTPATIENT
Start: 2020-05-19 | End: 2020-05-19

## 2020-05-19 RX ORDER — MAGNESIUM SULFATE HEPTAHYDRATE 40 MG/ML
4 INJECTION, SOLUTION INTRAVENOUS ONCE
Status: COMPLETED | OUTPATIENT
Start: 2020-05-19 | End: 2020-05-19

## 2020-05-19 RX ORDER — HYDROCORTISONE 25 MG/G
CREAM TOPICAL
Status: DISCONTINUED | OUTPATIENT
Start: 2020-05-19 | End: 2020-05-21 | Stop reason: HOSPADM

## 2020-05-19 RX ORDER — ONDANSETRON 4 MG/1
8 TABLET, FILM COATED ORAL EVERY 8 HOURS PRN
Status: DISCONTINUED | OUTPATIENT
Start: 2020-05-19 | End: 2020-05-21 | Stop reason: HOSPADM

## 2020-05-19 RX ORDER — IBUPROFEN 800 MG/1
800 TABLET ORAL EVERY 8 HOURS
Status: DISCONTINUED | OUTPATIENT
Start: 2020-05-19 | End: 2020-05-21 | Stop reason: HOSPADM

## 2020-05-19 RX ORDER — LABETALOL HYDROCHLORIDE 5 MG/ML
40 INJECTION, SOLUTION INTRAVENOUS
Status: ACTIVE | OUTPATIENT
Start: 2020-05-19 | End: 2020-05-19

## 2020-05-19 RX ORDER — ROPIVACAINE HYDROCHLORIDE 2 MG/ML
INJECTION, SOLUTION EPIDURAL; INFILTRATION; PERINEURAL CONTINUOUS PRN
Status: DISCONTINUED | OUTPATIENT
Start: 2020-05-19 | End: 2020-05-19 | Stop reason: SDUPTHER

## 2020-05-19 RX ORDER — IBUPROFEN 800 MG/1
800 TABLET ORAL EVERY 8 HOURS
Qty: 30 TABLET | Refills: 0 | Status: SHIPPED | OUTPATIENT
Start: 2020-05-19 | End: 2022-08-30

## 2020-05-19 RX ORDER — CALCIUM GLUCONATE 94 MG/ML
1 INJECTION, SOLUTION INTRAVENOUS PRN
Status: DISCONTINUED | OUTPATIENT
Start: 2020-05-19 | End: 2020-05-19

## 2020-05-19 RX ORDER — FENTANYL CITRATE 50 UG/ML
50 INJECTION, SOLUTION INTRAMUSCULAR; INTRAVENOUS ONCE
Status: DISCONTINUED | OUTPATIENT
Start: 2020-05-19 | End: 2020-05-19

## 2020-05-19 RX ORDER — PSEUDOEPHEDRINE HCL 30 MG
100 TABLET ORAL 2 TIMES DAILY
Qty: 60 CAPSULE | Refills: 0 | Status: SHIPPED | OUTPATIENT
Start: 2020-05-19 | End: 2022-08-30

## 2020-05-19 RX ORDER — LIDOCAINE HYDROCHLORIDE 20 MG/ML
INJECTION, SOLUTION EPIDURAL; INFILTRATION; INTRACAUDAL; PERINEURAL PRN
Status: DISCONTINUED | OUTPATIENT
Start: 2020-05-19 | End: 2020-05-19 | Stop reason: SDUPTHER

## 2020-05-19 RX ORDER — LABETALOL HYDROCHLORIDE 5 MG/ML
80 INJECTION, SOLUTION INTRAVENOUS
Status: ACTIVE | OUTPATIENT
Start: 2020-05-19 | End: 2020-05-19

## 2020-05-19 RX ORDER — SODIUM CHLORIDE, SODIUM LACTATE, POTASSIUM CHLORIDE, CALCIUM CHLORIDE 600; 310; 30; 20 MG/100ML; MG/100ML; MG/100ML; MG/100ML
INJECTION, SOLUTION INTRAVENOUS CONTINUOUS
Status: DISCONTINUED | OUTPATIENT
Start: 2020-05-19 | End: 2020-05-20

## 2020-05-19 RX ORDER — NIFEDIPINE 30 MG/1
30 TABLET, EXTENDED RELEASE ORAL DAILY
Status: DISCONTINUED | OUTPATIENT
Start: 2020-05-20 | End: 2020-05-21 | Stop reason: HOSPADM

## 2020-05-19 RX ADMIN — LIDOCAINE HYDROCHLORIDE AND EPINEPHRINE 5 ML: 15; 5 INJECTION, SOLUTION EPIDURAL at 09:24

## 2020-05-19 RX ADMIN — Medication 2.5 MILLION UNITS: at 14:43

## 2020-05-19 RX ADMIN — SODIUM CHLORIDE, POTASSIUM CHLORIDE, SODIUM LACTATE AND CALCIUM CHLORIDE: 600; 310; 30; 20 INJECTION, SOLUTION INTRAVENOUS at 06:57

## 2020-05-19 RX ADMIN — IBUPROFEN 800 MG: 800 TABLET, FILM COATED ORAL at 23:13

## 2020-05-19 RX ADMIN — Medication 10 ML: at 15:20

## 2020-05-19 RX ADMIN — SODIUM CHLORIDE, POTASSIUM CHLORIDE, SODIUM LACTATE AND CALCIUM CHLORIDE: 600; 310; 30; 20 INJECTION, SOLUTION INTRAVENOUS at 09:35

## 2020-05-19 RX ADMIN — ROPIVACAINE HYDROCHLORIDE 10 ML/HR: 2 INJECTION, SOLUTION EPIDURAL; INFILTRATION; PERINEURAL at 09:34

## 2020-05-19 RX ADMIN — FENTANYL CITRATE 50 MCG: 50 INJECTION, SOLUTION INTRAMUSCULAR; INTRAVENOUS at 08:18

## 2020-05-19 RX ADMIN — ONDANSETRON 4 MG: 2 INJECTION INTRAMUSCULAR; INTRAVENOUS at 07:55

## 2020-05-19 RX ADMIN — Medication 10 ML: at 09:34

## 2020-05-19 RX ADMIN — TRANEXAMIC ACID 1000 MG: 100 INJECTION, SOLUTION INTRAVENOUS at 17:50

## 2020-05-19 RX ADMIN — MAGNESIUM SULFATE HEPTAHYDRATE 2 G/HR: 40 INJECTION, SOLUTION INTRAVENOUS at 20:46

## 2020-05-19 RX ADMIN — Medication 20 MG: at 18:58

## 2020-05-19 RX ADMIN — ACETAMINOPHEN 1000 MG: 500 TABLET ORAL at 06:58

## 2020-05-19 RX ADMIN — Medication 200 MG: at 09:31

## 2020-05-19 RX ADMIN — Medication 20 MG: at 10:04

## 2020-05-19 RX ADMIN — Medication 2.5 MILLION UNITS: at 10:22

## 2020-05-19 RX ADMIN — ACETAMINOPHEN 1000 MG: 500 TABLET ORAL at 21:45

## 2020-05-19 RX ADMIN — LIDOCAINE HYDROCHLORIDE 5 ML: 20 INJECTION, SOLUTION EPIDURAL; INFILTRATION; INTRACAUDAL; PERINEURAL at 16:35

## 2020-05-19 RX ADMIN — MAGNESIUM SULFATE HEPTAHYDRATE 2 G/HR: 40 INJECTION, SOLUTION INTRAVENOUS at 10:29

## 2020-05-19 RX ADMIN — Medication 1 MILLI-UNITS/MIN: at 17:45

## 2020-05-19 RX ADMIN — FENTANYL CITRATE 100 MCG: 50 INJECTION INTRAMUSCULAR; INTRAVENOUS at 16:35

## 2020-05-19 RX ADMIN — NIFEDIPINE 30 MG: 30 TABLET, FILM COATED, EXTENDED RELEASE ORAL at 21:18

## 2020-05-19 RX ADMIN — DEXTROSE MONOHYDRATE 5 MILLION UNITS: 5 INJECTION INTRAVENOUS at 06:57

## 2020-05-19 RX ADMIN — LIDOCAINE HYDROCHLORIDE 3 ML: 10 INJECTION, SOLUTION EPIDURAL; INFILTRATION; INTRACAUDAL; PERINEURAL at 16:35

## 2020-05-19 RX ADMIN — DOCUSATE SODIUM 100 MG: 100 CAPSULE, LIQUID FILLED ORAL at 23:13

## 2020-05-19 RX ADMIN — MAGNESIUM SULFATE IN WATER 4 G: 40 INJECTION, SOLUTION INTRAVENOUS at 10:09

## 2020-05-19 RX ADMIN — Medication 1 MILLI-UNITS/MIN: at 14:30

## 2020-05-19 ASSESSMENT — PAIN SCALES - GENERAL
PAINLEVEL_OUTOF10: 6
PAINLEVEL_OUTOF10: 4
PAINLEVEL_OUTOF10: 4
PAINLEVEL_OUTOF10: 8

## 2020-05-19 NOTE — H&P
CAROLYNE   1 Term 06/23/11   10 lb (4.536 kg) F Vag-Spont EPI  CAROLYNE       Past Medical History:        Diagnosis Date    Anemia     w/ preg #2; oral Fe supplement    Depression        Past Surgical History:    History reviewed. No pertinent surgical history.     Allergies:    Nickel    Social History:    Social History     Socioeconomic History    Marital status: Single     Spouse name: Not on file    Number of children: Not on file    Years of education: Not on file    Highest education level: Not on file   Occupational History    Not on file   Social Needs    Financial resource strain: Not on file    Food insecurity     Worry: Not on file     Inability: Not on file    Transportation needs     Medical: Not on file     Non-medical: Not on file   Tobacco Use    Smoking status: Never Smoker    Smokeless tobacco: Never Used   Substance and Sexual Activity    Alcohol use: Not Currently    Drug use: Never    Sexual activity: Yes   Lifestyle    Physical activity     Days per week: Not on file     Minutes per session: Not on file    Stress: Not on file   Relationships    Social connections     Talks on phone: Not on file     Gets together: Not on file     Attends Restorationism service: Not on file     Active member of club or organization: Not on file     Attends meetings of clubs or organizations: Not on file     Relationship status: Not on file    Intimate partner violence     Fear of current or ex partner: Not on file     Emotionally abused: Not on file     Physically abused: Not on file     Forced sexual activity: Not on file   Other Topics Concern    Not on file   Social History Narrative    Not on file       Family History:       Problem Relation Age of Onset    Diabetes type 2  Maternal Grandmother         pre diabetic    Colon Cancer Maternal Grandfather     Kidney Disease Father     Hypertension Father     Hypertension Mother     No Known Problems Brother     Hypertension Sister     No Known

## 2020-05-19 NOTE — PROGRESS NOTES
Obstetric/Gynecology Resident Interval Note    Patient noted to have two severe range BP 15 minutes apart. Will start Procardia 30 XL. Patient continues to be asymptomatic. Attending and senior resident updated and in agreement with plan of care.      Jeni Cain DO  OB/GYN Resident, PGY1  [facility]  5/19/2020, 7:51 PM

## 2020-05-19 NOTE — FLOWSHEET NOTE
crna here to give fentanyl per epidural, Dr Carolina Guerrero here discussing plan of care, pt tearful, states the pain is too much

## 2020-05-19 NOTE — PROGRESS NOTES
OB/GYN MMW Resident Involvement Note     Date: 2020       Time: 5:58 AM   Patient Name: Julia العراقي     Patient : 1995  Room/Bed: [place] [address]    Admission Date/Time: 2020  5:31 AM      HPI: Julia العراقي is a 21 y.o. [id number] at 37w6d who presents with contractions since 0130. The patient reports fetal movement is present, complains of contractions, denies loss of fluid, denies vaginal bleeding. Elevated /106. Patient denies RUQ pain, nausea, vomitting, vision changes, HA. Patients pregnancy is complicated by contractions that started at 0130 and have increased in intensity since then. This is her third baby. She has a history of macrosomic infants (largest 10lb 5oz and smallest baby 10lbs), no Hx of shoulder dystocia, no hx of PPH, no uterine atony or blood transfusion. DATING:  LMP: Patient's last menstrual period was 2019.   Estimated Date of Delivery: 6/3/20   Based on: early ultrasound, at 5 1/7 weeks GA    PREGNANCY RISK FACTORS:  Patient Active Problem List   Diagnosis    History of macrosomia in infant in prior pregnancy, currently pregnant    Obesity affecting pregnancy    High-risk pregnancy    RECEIVED flu shot    At risk for depression    HEATH (generalized anxiety disorder)    Elevated 1 hour-normal 3 hour    RECEIVED tdap    GBS (group B Streptococcus carrier), +RV culture, currently pregnant    Uterine contractions during pregnancy        Steroids Given In This Pregnancy:  no     REVIEW OF SYSTEMS:  Constitutional: negative fever, negative chills  HEENT: negative visual disturbances, negative headaches  Respiratory: negative dyspnea, negative cough  Cardiovascular: negative chest pain,  negative palpitations  Gastrointestinal: positive abdominal pain (contractions), negative RUQ pain, negative N/V, negative diarrhea, negative constipation  Genitourinary: negative dysuria, negative vaginal discharge  Dermatological: negative rash  Hematologic: negative bruising  Immunologic/Lymphatic: negative recent illness, negative recent sick contact  Musculoskeletal: negative back pain  Neurological:  negative dizziness, negative weakness  Behavior/Psych: negative depression, negative anxiety      OBSTETRICAL HISTORY:   OB History    Para Term  AB Living   4 2 2 0 1 2   SAB TAB Ectopic Molar Multiple Live Births   0 1 0 0 0 2      # Outcome Date GA Lbr Dionisio/2nd Weight Sex Delivery Anes PTL Lv   4 Current            3 TAB 2018           2 Term 16   10 lb 5 oz (4.678 kg) M Vag-Spont EPI  CAROLYNE   1 Term 11   10 lb (4.536 kg) F Vag-Spont EPI  CAROLYNE       PAST MEDICAL HISTORY:   has a past medical history of Anemia and Depression. PAST SURGICAL HISTORY:   has no past surgical history on file. ALLERGIES:  is allergic to nickel. MEDICATIONS:  Prior to Admission medications    Medication Sig Start Date End Date Taking? Authorizing Provider   omeprazole (PRILOSEC) 20 MG delayed release capsule Take 1 capsule by mouth Daily 20  Yes BRENDAN Youngblood CNM   Prenatal Vit-Fe Fumarate-FA (PRENATAL VITAMIN PO) Take by mouth   Yes Historical Provider, MD   acetaminophen (TYLENOL) 325 MG tablet Take 650 mg by mouth every 6 hours as needed for Pain    Historical Provider, MD   famotidine (PEPCID) 40 MG tablet Take 40 mg by mouth daily    Historical Provider, MD       FAMILY HISTORY:  family history includes Breast Cancer in her maternal aunt; Colon Cancer in her maternal grandfather; Diabetes type 2  in her maternal grandmother; Hypertension in her father, mother, and sister; Kidney Disease in her father; No Known Problems in her brother and sister. SOCIAL HISTORY:   reports that she has never smoked. She has never used smokeless tobacco. She reports previous alcohol use. She reports that she does not use drugs.     VITALS:  Vitals:    20 0549 20 0555   BP:  (!) 148/106   Pulse:  112   Resp: 18    Temp: 98.4 °F (36.9 °C)    TempSrc:

## 2020-05-19 NOTE — ANESTHESIA PROCEDURE NOTES
Epidural Block    Patient location during procedure: OB  Reason for block: labor epidural  Staffing  Resident/CRNA: Jeremiah Damian APRN - CRNA  Performed: resident/CRNA   Preanesthetic Checklist  Completed: patient identified, site marked, surgical consent, pre-op evaluation, timeout performed, IV checked, risks and benefits discussed, monitors and equipment checked, anesthesia consent given, oxygen available and patient being monitored  Epidural  Patient position: sitting  Prep: Betadine  Patient monitoring: continuous pulse ox and frequent blood pressure checks  Approach: midline  Location: lumbar (1-5)  Injection technique: DENITA saline  Provider prep: mask and sterile gloves  Needle  Needle type: Tuohy   Needle gauge: 17 G  Needle length: 3.5 in  Needle insertion depth: 7 cm  Catheter type: end hole  Catheter at skin depth: 13 cm  Test dose: negative  Assessment  Hemodynamics: stable  Attempts: 1

## 2020-05-19 NOTE — ANESTHESIA PRE PROCEDURE
injection 4 mg  4 mg Intravenous Q6H PRN Connie Gonzalez MD        ropivacaine 0.2% in sodium chloride 0.9% (OB) epidural 100 mL  10 mL/hr Epidural Continuous Connie Gonzalez MD           Allergies: Allergies   Allergen Reactions    Nickel Rash     Rash         Problem List:    Patient Active Problem List   Diagnosis Code    History of macrosomia in infant in prior pregnancy, currently pregnant O09.299    Obesity affecting pregnancy O99.210    High-risk pregnancy O09.90    RECEIVED flu shot Z23    At risk for depression Z91.89    HEATH (generalized anxiety disorder) F41.1    Elevated 1 hour-normal 3 hour O99.810    RECEIVED tdap Z23    GBS (group B Streptococcus carrier), +RV culture, currently pregnant O99.820    Uterine contractions during pregnancy O62.2    Rh+/RI/GBS+ Z67.90       Past Medical History:        Diagnosis Date    Anemia     w/ preg #2; oral Fe supplement    Depression        Past Surgical History:  History reviewed. No pertinent surgical history. Social History:    Social History     Tobacco Use    Smoking status: Never Smoker    Smokeless tobacco: Never Used   Substance Use Topics    Alcohol use: Not Currently                                Counseling given: Not Answered      Vital Signs (Current):   Vitals:    05/19/20 0549 05/19/20 0555 05/19/20 0619 05/19/20 0800   BP:  (!) 148/106 (!) 148/92 (!) 150/98   Pulse:  112 87 84   Resp: 18   17   Temp: 36.9 °C (98.4 °F)   36.8 °C (98.3 °F)   TempSrc: Oral      Weight:    276 lb (125.2 kg)   Height:    5' 9\" (1.753 m)                                              BP Readings from Last 3 Encounters:   05/19/20 (!) 150/98   05/18/20 124/80   05/11/20 (!) 140/89       NPO Status:                                                                                 BMI:   Wt Readings from Last 3 Encounters:   05/19/20 276 lb (125.2 kg)   05/18/20 276 lb (125.2 kg)   05/11/20 264 lb (119.7 kg)     Body mass index is 40.76 kg/m².     CBC:   Lab

## 2020-05-19 NOTE — FLOWSHEET NOTE
States pain is excruciating, especially in her rectum, warm compresses to area, bolus to epidural several times, then CRNA here to bolus epidural, pt tearful, states I cant do this, I want to be done, supportive care and encouragement given

## 2020-05-19 NOTE — PROGRESS NOTES
Labor Progress Note    Gerard Yo is a 21 y.o. female [id number] at 41w10d  The patient was seen and examined. Her pain is not well controlled. She reports fetal movement is present, complains of contractions, complains of loss of fluid, denies vaginal bleeding.        Vital Signs:  Vitals:    05/19/20 0549 05/19/20 0555 05/19/20 0619 05/19/20 0800   BP:  (!) 148/106 (!) 148/92 (!) 150/98   Pulse:  112 87 84   Resp: 18   17   Temp: 98.4 °F (36.9 °C)   98.3 °F (36.8 °C)   TempSrc: Oral      Weight:    276 lb (125.2 kg)   Height:    5' 9\" (1.753 m)        FHT: 130, moderate variability, accelerations present, decelerations absent  Contractions: regular, every 2-4 minutes  Cervical Exam: deferred  Pitocin: @ 0 mu/min    Membranes: Ruptured clear fluid  Scalp Electrode in place: absent  Intrauterine Pressure Catheter in Place: absent    Interventions: none    Assessment/Plan:  Gerard Yo is a 21 y.o. female G4T2409 at 41w10d admitted for spontaneous labor   - GBS positive, Pen G for GBS prophylaxis   - Elevated BP, met criteria for gHTN   - PreE labs wnl, P/C pending   - Patient denies s/s preE   - SROM (cl) @7541   - Patient requesting epidural   - Continue expectant management    Senior resident updated and in agreement with plan    Debbie Saldivar DO  Ob/Gyn Resident  5/19/2020, 9:10 AM

## 2020-05-19 NOTE — ANESTHESIA POSTPROCEDURE EVALUATION
Department of Anesthesiology  Postprocedure Note    Patient: Hardy Bullard  MRN: [id number]  YOB: 1995  Date of evaluation: 5/19/2020  Time:  7:11 PM     Procedure Summary     Date:  05/19/20 Room / Location:      Anesthesia Start:  0913 Anesthesia Stop:  7266    Procedure:  Labor Analgesia Diagnosis:      Scheduled Providers:   Responsible Provider:  Lianna Hanson MD    Anesthesia Type:  epidural ASA Status:  2          Anesthesia Type: epidural    Ivan Phase I: Ivan Score: 10    Ivan Phase II:      Last vitals: Reviewed and per EMR flowsheets.        Anesthesia Post Evaluation    Patient location during evaluation: floor  Patient participation: complete - patient participated  Level of consciousness: awake  Pain score: 0  Airway patency: patent  Nausea & Vomiting: no vomiting and no nausea  Complications: no  Cardiovascular status: blood pressure returned to baseline  Respiratory status: acceptable  Hydration status: stable

## 2020-05-19 NOTE — PROGRESS NOTES
Patient's blood pressures noted to be in the severe range > 160/110 for > 15 min. Patient to be given 20 mg IV Labetalol. Patient now meets criteria for Pre-Eclampsia with Severe Features. A 4g Magnesium Sulfate bolus to be given at this time followed by 2g/hr x 24 hours following delivery. Rui Balbuena CNM and Dr. Omar Starks notified and in agreement with plan.     Vitals:    05/19/20 0930 05/19/20 0933 05/19/20 0935 05/19/20 0937   BP: (!) 172/98 (!) 168/90 (!) 160/90 (!) 158/93   Pulse: 77 77 82 76   Resp: 18 17 17 16   Temp:       TempSrc:       Weight:       Height:               Stefan Phillips DO  Ob/Gyn Resident PGY-4  5/19/2020, 9:53 AM

## 2020-05-19 NOTE — FLOWSHEET NOTE
0960 CRNA at bedside. Epidural procedure explained, risks discussed. Pt verbalizes consent for epidural.  0910patient positioned for epidural.0915 Time out completed. 4880 catheter placed. 7520 test dose given. Epidural catheter taped and secured per anesthesia.0929 to low fowlers with left uterine displacement. 0930 loading dose given. 0936 pump initiated. Pt tolerated procedure well.

## 2020-05-19 NOTE — L&D DELIVERY NOTE
Mother's Information    Labor Events     labor?:  No  Rupture type:  Spontaneous=SROM  Fluid color:  Clear  Fluid odor:  None     Mother Delivery Information    Episiotomy:  None  Lacerations:  None  Repair Suture:  None  Surgical or Additional Est. Blood Loss (mL):  0 (View Only):  Edit in Flowsheets   Combined Est. Blood Loss (mL):  0         Boy Norma Gtz [[id number]]    Labor Events     labor?:  No   steroids?:  None  Cervical ripening date/time:     Antibiotics received during labor?:  Yes  Rupture Identifier:  Sac 1   Rupture date/time: 20 08:30:00   Rupture type:  Spontaneous=SROM  Fluid color:  Clear  Fluid odor:  None  Induction:  None  Augmentation:  Oxytocin  Indications for augmentation:  Hypertension, Ineffective Contraction Pattern  Labor complications:  Shoulder Dystocia          Labor Event Times    Labor onset date/time: 20 0830 EDT   Dilation complete date/time:   20 1400   Start pushin2020 1400   Decision time (emergent ):        Anesthesia    Method:  Epidural     Assisted Delivery Details    Vacuum extractor attempted?:  Yes  Indications:  Maternal Fatigue   Vacuum type:  Kiwi   Vacuum application location:  Outlet   First attempt time vacuum applied:  1726    First attempt time vacuum removed:  1726       Document Additional Attempt       Document Additional Attempt       Number of pop offs:  1      Number of pulls:  4       Shoulder Dystocia    Shoulder dystocia present?:  Yes  Anterior shoulder:  right    Time recognized:  2020 17:31:00    Time help called:  2020 17:15:00 Help called by:  ivonne   Physician/Provider arrived:  2020 17:15:00 Physician/Provider:  Gabrielle Saucedo    team arrived:  2020 17:28:00  staff:  Ashley Luna   Additional staff arrived:  2020 17:28:00 Additional staff:  karlie echevarria   Gentle attempt at traction, assisted by maternal expulsive forces?:  Yes    First maneuver:  Darlin

## 2020-05-19 NOTE — ANESTHESIA POSTPROCEDURE EVALUATION
Department of Anesthesiology  Postprocedure Note    Patient: Oliva Tay  MRN: [id number]  YOB: 1995  Date of evaluation: 5/19/2020  Time:  6:19 PM     Procedure Summary     Date:  05/19/20 Room / Location:      Anesthesia Start:  0913 Anesthesia Stop:  6469    Procedure:  Labor Analgesia Diagnosis:      Scheduled Providers:   Responsible Provider:  Jeni Tamez MD    Anesthesia Type:  epidural ASA Status:  2          Anesthesia Type: epidural    Ivan Phase I: Ivan Score: 10    Ivan Phase II:      Last vitals: Reviewed and per EMR flowsheets.        Anesthesia Post Evaluation    Patient location during evaluation: PACU  Patient participation: complete - patient participated  Level of consciousness: awake and alert  Pain score: 2  Airway patency: patent  Nausea & Vomiting: no nausea and no vomiting  Complications: no  Cardiovascular status: hemodynamically stable  Respiratory status: acceptable  Hydration status: euvolemic

## 2020-05-20 LAB
MAGNESIUM: 3.7 MG/DL (ref 1.6–2.6)
MAGNESIUM: 3.8 MG/DL (ref 1.6–2.6)
MAGNESIUM: 4.2 MG/DL (ref 1.6–2.6)

## 2020-05-20 PROCEDURE — 83735 ASSAY OF MAGNESIUM: CPT

## 2020-05-20 PROCEDURE — 2580000003 HC RX 258: Performed by: STUDENT IN AN ORGANIZED HEALTH CARE EDUCATION/TRAINING PROGRAM

## 2020-05-20 PROCEDURE — 6370000000 HC RX 637 (ALT 250 FOR IP): Performed by: STUDENT IN AN ORGANIZED HEALTH CARE EDUCATION/TRAINING PROGRAM

## 2020-05-20 PROCEDURE — 1220000000 HC SEMI PRIVATE OB R&B

## 2020-05-20 PROCEDURE — 36415 COLL VENOUS BLD VENIPUNCTURE: CPT

## 2020-05-20 PROCEDURE — 6360000002 HC RX W HCPCS: Performed by: STUDENT IN AN ORGANIZED HEALTH CARE EDUCATION/TRAINING PROGRAM

## 2020-05-20 RX ORDER — GUAIFENESIN 600 MG/1
600 TABLET, EXTENDED RELEASE ORAL 2 TIMES DAILY PRN
Status: DISCONTINUED | OUTPATIENT
Start: 2020-05-20 | End: 2020-05-21 | Stop reason: HOSPADM

## 2020-05-20 RX ORDER — ECHINACEA PURPUREA EXTRACT 125 MG
1 TABLET ORAL PRN
Status: DISCONTINUED | OUTPATIENT
Start: 2020-05-20 | End: 2020-05-21 | Stop reason: HOSPADM

## 2020-05-20 RX ADMIN — IBUPROFEN 800 MG: 800 TABLET, FILM COATED ORAL at 23:16

## 2020-05-20 RX ADMIN — DOCUSATE SODIUM 100 MG: 100 CAPSULE, LIQUID FILLED ORAL at 20:23

## 2020-05-20 RX ADMIN — ACETAMINOPHEN 1000 MG: 500 TABLET ORAL at 09:55

## 2020-05-20 RX ADMIN — IBUPROFEN 800 MG: 800 TABLET, FILM COATED ORAL at 07:17

## 2020-05-20 RX ADMIN — ACETAMINOPHEN 1000 MG: 500 TABLET ORAL at 03:54

## 2020-05-20 RX ADMIN — SODIUM CHLORIDE, POTASSIUM CHLORIDE, SODIUM LACTATE AND CALCIUM CHLORIDE: 600; 310; 30; 20 INJECTION, SOLUTION INTRAVENOUS at 09:55

## 2020-05-20 RX ADMIN — ACETAMINOPHEN 1000 MG: 500 TABLET ORAL at 16:12

## 2020-05-20 RX ADMIN — NIFEDIPINE 30 MG: 30 TABLET, FILM COATED, EXTENDED RELEASE ORAL at 08:11

## 2020-05-20 RX ADMIN — IBUPROFEN 800 MG: 800 TABLET, FILM COATED ORAL at 15:02

## 2020-05-20 RX ADMIN — Medication 10 ML: at 20:23

## 2020-05-20 RX ADMIN — DOCUSATE SODIUM 100 MG: 100 CAPSULE, LIQUID FILLED ORAL at 08:11

## 2020-05-20 RX ADMIN — GUAIFENESIN 600 MG: 600 TABLET, EXTENDED RELEASE ORAL at 09:55

## 2020-05-20 RX ADMIN — MAGNESIUM SULFATE HEPTAHYDRATE 2 G/HR: 40 INJECTION, SOLUTION INTRAVENOUS at 06:53

## 2020-05-20 RX ADMIN — BENZOCAINE AND LEVOMENTHOL: 200; 5 SPRAY TOPICAL at 23:16

## 2020-05-20 ASSESSMENT — PAIN SCALES - GENERAL
PAINLEVEL_OUTOF10: 2
PAINLEVEL_OUTOF10: 2
PAINLEVEL_OUTOF10: 4
PAINLEVEL_OUTOF10: 5
PAINLEVEL_OUTOF10: 3
PAINLEVEL_OUTOF10: 4
PAINLEVEL_OUTOF10: 4

## 2020-05-20 NOTE — LACTATION NOTE
Met with mom at bedside, baby Arrie Robyn asleep in crib. Mom is doing both breast and bottle, gave bottle this am. Baby should be ready to eat around noon. Encouraged mom to call for LC to assist with latch. Written breast feeding educational materials provided. Mom will work with payor to get pump.

## 2020-05-20 NOTE — PROGRESS NOTES
positive/Rubella immune  Preeclampsia with severe features (BP/P:C)  -s/p IV Labetalol 20mg IVx2  - Started on procardia 30xL QD  - Continue with magnesium sulfate @ 2g/hr until 1730  - Continue mag checks q4 hours, and levels q6 hours  - Patient denies any headache/blurriness/vision changes

## 2020-05-20 NOTE — PLAN OF CARE
Problem: Pain:  Goal: Pain level will decrease  Description: Pain level will decrease  Outcome: Ongoing     Problem: Fluid Volume - Imbalance:  Goal: Absence of imbalanced fluid volume signs and symptoms  Description: Absence of imbalanced fluid volume signs and symptoms  Outcome: Ongoing  Goal: Absence of postpartum hemorrhage signs and symptoms  Description: Absence of postpartum hemorrhage signs and symptoms  Outcome: Ongoing     Problem: Infection - Risk of, Puerperal Infection:  Goal: Will show no infection signs and symptoms  Description: Will show no infection signs and symptoms  Outcome: Ongoing     Problem: Mood - Altered:  Goal: Mood stable  Description: Mood stable  Outcome: Ongoing     Problem: Pain - Acute:  Goal: Pain level will decrease  Description: Pain level will decrease  Outcome: Ongoing

## 2020-05-20 NOTE — CARE COORDINATION
Discharge Plan: Writer met w/ patient (patient's parent) at bedside to discuss DCP. Anticipate DC of couplet 2020 after  2020. Infant name on BC: Aliza Demarco. Infant to [facility]. Infant PCP Dr. Keyla Mccarthy. FOB: Jennifer Solis Phone [phone]    Writer verified MMO primary and [employer] w/patient (patient's parent) and address on facesheet. Faxed to [facility] for name/address/insurance correction :n/a    Writer notified patient (patient's parent)  has 30 days from date of birth to add infant to insurance policy. Melania Cuello verbalized understanding and will call MMO & JFS. Melania Cuello verbalized has all necessary items for infant. No previous home care or dme and no anticipated need for home nursing or dme. CM continue to follow for any DC needs.

## 2020-05-20 NOTE — FLOWSHEET NOTE
Initiation of Electric Breast Pumping     Pumping Initiated at 747    Initiated due to    [x]   Baby in NICU   []   Plans exclusive pumping   []   Infant weight loss(supplement)   []   Baby not latching well    Flange Size    Right:   Left:     []   24    []   24     [x]   27    [x]   27     []   30    []   30     []   36    []   36  Instructions   [x]   Verbal instructions on how to setup pump and how to use initiation phase   [x]   Written sheet\" How to keep your breast pump kit clean\"   [x]   Expectation sheet for Breastfeeding mothers with pumping log   [x]   Frequency of pumping   [x]   Collection,labeling and storage of colostrum and milk    Supplies Provided   [x]   Pump initiation kit   [x]   Cleaning supplies (basin and soap)   [x]   Additional flange size   []   Oral syringes/snappies   [x]   Patient labels       -

## 2020-05-20 NOTE — PROGRESS NOTES
Resident Interval Magnesium Sulfate Note    Connie Royal is a 21 y.o. female [id number] PPD# 0 s/p VAVD  The patient is resting comfortably. She denies headache, visual changes and abdominal pain in the right upper quadrant. She denies any shortness of breath or chest pain. She denies change in her extremities, regarding swelling. Continuous Medications:    magnesium sulfate 2 g/hr (05/19/20 2046)    lactated ringers      oxytocin         Vitals:    Vitals:    05/19/20 2115 05/19/20 2130 05/19/20 2200 05/19/20 2300   BP: (!) 153/88 (!) 148/89 (!) 143/89 (!) 152/90   Pulse: 90 87 95 103   Resp:   20 20   Temp:       TempSrc:       SpO2:   99% 97%   Weight:       Height:             Physical Exam:  Chest: clear to auscultation bilaterally  Heart: RRR no murmur  Abdomen: soft, nontender, nondistended  Extremities: DTR normal Right: 2/4   Left: 2/4  Clonus: absent    Urine Output: 650/hr; Clear urine    Labs:  Last Magnesium Level:   Lab Results   Component Value Date    MG 4.0 05/19/2020       BMP:    Recent Labs     05/19/20  0637      K 3.7      CO2 18*   BUN 4*   CREATININE 0.42*   GLUCOSE 111*       ASSESSMENT/PLAN  Connie Royal is a 21 y.o. female [id number] PPD# 1 s/p VAVD with PreE w/ SF (BP, P/C)              - s/p IV Labetalol 20mg IV x2, last @ 1858   - Procardia 30 XL given will continue QD              - PreE labs WNL x 1, P:C 0.73  - Continue Magnesium Sulfate Treatment @ 2g/hr              - Mag checks Q 4 hours              - Mag levels Q 6 hours.                - Strict Is and Os              - [address], DO  Ob/Gyn Resident  5/19/2020, 11:19 PM

## 2020-05-21 VITALS
HEIGHT: 69 IN | HEART RATE: 86 BPM | RESPIRATION RATE: 20 BRPM | WEIGHT: 276 LBS | SYSTOLIC BLOOD PRESSURE: 143 MMHG | BODY MASS INDEX: 40.88 KG/M2 | OXYGEN SATURATION: 99 % | DIASTOLIC BLOOD PRESSURE: 93 MMHG | TEMPERATURE: 98.9 F

## 2020-05-21 PROBLEM — O99.810 ABNORMAL GLUCOSE AFFECTING PREGNANCY: Status: RESOLVED | Noted: 2020-02-18 | Resolved: 2020-05-21

## 2020-05-21 PROBLEM — O47.9 UTERINE CONTRACTIONS DURING PREGNANCY: Status: RESOLVED | Noted: 2020-05-19 | Resolved: 2020-05-21

## 2020-05-21 PROBLEM — Z23 NEED FOR TDAP VACCINATION: Status: RESOLVED | Noted: 2020-04-02 | Resolved: 2020-05-21

## 2020-05-21 PROBLEM — Z67.90 RH(D) POSITIVE: Status: RESOLVED | Noted: 2020-05-19 | Resolved: 2020-05-21

## 2020-05-21 PROBLEM — Z23 NEEDS FLU SHOT: Status: RESOLVED | Noted: 2020-01-02 | Resolved: 2020-05-21

## 2020-05-21 PROBLEM — O99.820 GBS (GROUP B STREPTOCOCCUS CARRIER), +RV CULTURE, CURRENTLY PREGNANT: Status: RESOLVED | Noted: 2020-05-06 | Resolved: 2020-05-21

## 2020-05-21 PROBLEM — O99.210 OBESITY AFFECTING PREGNANCY: Status: RESOLVED | Noted: 2020-01-02 | Resolved: 2020-05-21

## 2020-05-21 PROBLEM — O09.891 MATERNAL EXPOSURE TO ALCOHOL IN FIRST TRIMESTER: Status: RESOLVED | Noted: 2020-05-19 | Resolved: 2020-05-21

## 2020-05-21 LAB — SURGICAL PATHOLOGY REPORT: NORMAL

## 2020-05-21 PROCEDURE — 6370000000 HC RX 637 (ALT 250 FOR IP): Performed by: STUDENT IN AN ORGANIZED HEALTH CARE EDUCATION/TRAINING PROGRAM

## 2020-05-21 RX ADMIN — ACETAMINOPHEN 1000 MG: 500 TABLET ORAL at 05:46

## 2020-05-21 RX ADMIN — ACETAMINOPHEN 1000 MG: 500 TABLET ORAL at 08:38

## 2020-05-21 RX ADMIN — NIFEDIPINE 30 MG: 30 TABLET, FILM COATED, EXTENDED RELEASE ORAL at 08:39

## 2020-05-21 RX ADMIN — DOCUSATE SODIUM 100 MG: 100 CAPSULE, LIQUID FILLED ORAL at 08:39

## 2020-05-21 RX ADMIN — IBUPROFEN 800 MG: 800 TABLET, FILM COATED ORAL at 08:39

## 2020-05-21 ASSESSMENT — PAIN DESCRIPTION - DESCRIPTORS: DESCRIPTORS: DISCOMFORT;SORE

## 2020-05-21 ASSESSMENT — PAIN DESCRIPTION - PAIN TYPE: TYPE: ACUTE PAIN

## 2020-05-21 ASSESSMENT — PAIN SCALES - GENERAL
PAINLEVEL_OUTOF10: 4
PAINLEVEL_OUTOF10: 3

## 2020-05-21 ASSESSMENT — PAIN DESCRIPTION - LOCATION: LOCATION: ABDOMEN

## 2020-05-21 NOTE — PROGRESS NOTES
POST PARTUM DAY # 2    Elvis Landaverde is a 21 y.o. female  This patient was seen & examined today. VAVD 5/19/20    Her pregnancy was complicated by:   Patient Active Problem List   Diagnosis    History of macrosomia in infant in prior pregnancy, currently pregnant    Obesity affecting pregnancy    High-risk pregnancy    RECEIVED flu shot    At risk for depression    HEATH (generalized anxiety disorder)    Elevated 1 hour-normal 3 hour    RECEIVED tdap    GBS (group B Streptococcus carrier), +RV culture, currently pregnant    Uterine contractions during pregnancy    Rh+/RI/GBS+    VAVD 5/19/20 M Apg 7/9 Wt 11#14    Vacuum-assisted vaginal delivery (G4)    Shoulder dystocia (G4)    Preeclampsia    Anemia    Maternal exposure to alcohol in first trimester       Today she is doing well without any chief complaint. Her lochia is light. She denies chest pain, shortness of breath, headache, lightheadedness, blurred vision, peripheral edema and palpitations. She is breast and bottle feeding and she denies any breast tenderness. She is ambulating well. Her voiding pattern is normal. I reviewed signs and symptoms of post partum depression with the patient, she currently denies any of these symptoms. She is tolerating solids.      Vital Signs:  Vitals:    05/20/20 1600 05/20/20 1730 05/20/20 2015 05/20/20 2348   BP: 136/72 (!) 144/91 (!) 143/82 128/75   Pulse: 92 88 82 62   Resp: 18 18 20 18   Temp:   98.5 °F (36.9 °C)    TempSrc:   Oral    SpO2: 99%      Weight:       Height:             Physical Exam:  General:  no apparent distress, alert and cooperative  Neurologic:  alert, oriented, normal speech, no focal findings  Lungs:  No increased work of breathing, good air exchange, clear to auscultation bilaterally, no crackles or wheezing  Heart:  normal S1 and S2 and regular rate and rhythm    Abdomen: abdomen soft, non-distended, non-tender  Fundus: non-tender, normal size, firm, below umbilicus  Extremities: no calf tenderness, non edematous     Lab:  Lab Results   Component Value Date    HGB 9.5 (L) 05/19/2020     Lab Results   Component Value Date    HCT 30.7 (L) 05/19/2020       Assessment/Plan:  1. Connie Royal is a [id number] PPD # 1 s/p VAVD with PreE w/ SF (BP, P/C)   - Doing well  - VSS   - male infant in [facility], circumcision desired   - Encourage ambulation   - Postpartum Hgb/Hct if symptomatic   2. Rh positive/Rubella immune  3. Both breast and bottle    - Denies s/s mastitis at this time  4. PreE w/ SF (BP, P:C)   - s/p IV Labetalol 20mg IV x2, last @ 1858              - Procardia 30 XL QD              - PreE labs WNL x 1, P:C 0.73   - s/p mag x 24 hours   - BP 140s/80s-90s  5. Anemia   - Hgb 9.5 on admission   - Clinically asymptomatic   - Iron on d/c   - Labs if symptomatic   6. Continue post partum care    Counseling Completed:  Secondary Smoke risks and Sudden Infant Death Syndrome were reviewed with recommendations. Infant sleeping, \"back to sleep\" and avoidance of co-sleeping recommendations were reviewed. Signs and Symptoms of Post Partum Depression were reviewed. The patient is to call if any occur. Signs and symptoms of Mastitis were reviewed. The patient is to call if any occur for follow up.   Discharge instructions including pelvic rest, no driving with pain medicine and office follow-up were reviewed with patient     Attending Physician: Dr. Ceci De La Cruz,   Ob/Gyn Resident   5/21/2020, 3:42 AM

## 2020-05-21 NOTE — PLAN OF CARE
Problem: Pain:  Goal: Pain level will decrease  Description: Pain level will decrease  Outcome: Ongoing  Goal: Control of acute pain  Description: Control of acute pain  Outcome: Ongoing     Problem: Constipation:  Goal: Bowel elimination is within specified parameters  Description: Bowel elimination is within specified parameters  Outcome: Ongoing     Problem: Fluid Volume - Imbalance:  Goal: Absence of imbalanced fluid volume signs and symptoms  Description: Absence of imbalanced fluid volume signs and symptoms  Outcome: Ongoing  Goal: Absence of postpartum hemorrhage signs and symptoms  Description: Absence of postpartum hemorrhage signs and symptoms  Outcome: Ongoing     Problem: Infection - Risk of, Puerperal Infection:  Goal: Will show no infection signs and symptoms  Description: Will show no infection signs and symptoms  Outcome: Ongoing     Problem: Mood - Altered:  Goal: Mood stable  Description: Mood stable  Outcome: Ongoing     Problem: Pain - Acute:  Goal: Pain level will decrease  Description: Pain level will decrease  Outcome: Ongoing

## 2020-05-21 NOTE — LACTATION NOTE
Met with mom in NICU, mom has baby Joanell Scale to left breast in football hold. Baby is latched deeply with bursts of sucking noted. Mom had med necessity for pump signed, writer completed. Mom reminded to contact her payor to obtain pump, or she can contact wic at health dept for assistance. Mom plans discharge home today. Baby will remain in NICU.

## 2020-05-21 NOTE — PROGRESS NOTES
[facility]   Vaginal Postpartum Note   Hospital Day: 3   Postpartum Day 2      SUBJECTIVE:  Voices no concerns today. Doing well. Voiding, ambulating, eating without difficulty. Denies any leg pain. Bonding with baby. Resting well. Reports pain/cramping and is 4/10 on the pain scale. She reports it is controlled with oral meds. Reports that baby remains in NICU is on antibiotics and oxygen. Reports is pumping because they are no longer letting him feed due to respiratory status. OBJECTIVE:     Vitals:  BP (!) 143/93   Pulse 86   Temp 98.9 °F (37.2 °C)   Resp 20   Ht 5' 9\" (1.753 m)   Wt 276 lb (125.2 kg)   LMP 08/17/2019   SpO2 99%   Breastfeeding Unknown   BMI 40.76 kg/m²     Constitutional:  Awake, alert, cooperative, no apparent distress. Appears to be bonding well with baby, does not appear overly stressed with infant handling    Pain:  controlled    Breasts:  Soft without tenderness, nipples are intact    Abdominal Exam: Soft, non-tender, lax muscle tone                                Fundus is mid-line, firm @ U/-U. Non-tender with palpation. Bladder non-distended    Perineum: Intact and healing. Mild erythema, edema or bruising present    Lochia: Small and Rubra    Extremities: Negative Eliud sign. Minimal edema     Voiding QS,     Labs:   Lab Results   Component Value Date    HGB 9.5 05/19/2020    HCT 30.7 05/19/2020       ASSESSMENT/PLAN:   Post partum vaginal birth Day 2  - Continue PP care  - Reviewed birth experience and is tearful but taking it all in. Reports good support system. - Discharge instructions were reviewed regarding perineal care, breast care, activity, rest, diet, hygiene and PP depression. Questions were answered.   - Discharge planned for 5/21/2020   - RTO in 1 week blood pressure check  - Reviewed care with  who is agreeable to discharge home

## 2020-05-22 ENCOUNTER — CARE COORDINATION (OUTPATIENT)
Dept: OTHER | Facility: CLINIC | Age: 25
End: 2020-05-22

## 2020-05-22 NOTE — CARE COORDINATION
symptoms:  Notified PCP/Physician  Do you have a copy of your discharge instructions?:  Yes  Do you have all of your prescriptions and are they filled?:  Yes  Have you scheduled your follow up appointment?:  Yes  Were you discharged with any Home Care or Post Acute Services:  No  Care Transitions Interventions         Follow Up  Future Appointments   Date Time Provider Alfa Escobedo   5/26/2020  6:00 AM ST[facility] OP L&D [address] [facility]   5/26/2020 11:45 AM BRENDAN López CNM [facility] CHELSI Jaramillo RN BSN  Associate Care Manager  Phone: [phone]  Email: [email or web address]

## 2020-05-27 ENCOUNTER — OFFICE VISIT (OUTPATIENT)
Dept: OBGYN CLINIC | Age: 25
End: 2020-05-27

## 2020-05-27 ENCOUNTER — CARE COORDINATION (OUTPATIENT)
Dept: OTHER | Facility: CLINIC | Age: 25
End: 2020-05-27

## 2020-05-27 VITALS
BODY MASS INDEX: 34.3 KG/M2 | HEIGHT: 69 IN | DIASTOLIC BLOOD PRESSURE: 62 MMHG | WEIGHT: 231.6 LBS | SYSTOLIC BLOOD PRESSURE: 118 MMHG | HEART RATE: 84 BPM

## 2020-05-27 PROCEDURE — 99024 POSTOP FOLLOW-UP VISIT: CPT | Performed by: ADVANCED PRACTICE MIDWIFE

## 2020-05-27 RX ORDER — HYDROCORTISONE 25 MG/G
CREAM TOPICAL
Qty: 1 TUBE | Refills: 1 | Status: SHIPPED | OUTPATIENT
Start: 2020-05-27 | End: 2022-08-30

## 2020-05-27 NOTE — PROGRESS NOTES
Patient states she is having a difficult time breastfeeding goes to see [facility]. Patient states she is pumping and direct breast. Patient wants to try to supplement with formula. Patient states she has some pain in her bottom.

## 2020-05-27 NOTE — CARE COORDINATION
[facility] Transitions Follow Up Call    2020    Patient: Jen Acuna  Patient : 1995   MRN: [id number]  Reason for Admission: s/p vaginal delivery with vacuum  Discharge Date: 20 RARS: Readmission Risk Score: 5         Spoke with: Geo Arteaga, patient    ACM received return call from patient this date. Patient states she had appt with OB today and was given Anusol cream for hemorrhoids. States she is getting ready to use the cream now. States she is having trouble with constipation related to hemorrhoids. States OB advised her to get OTC Miralax, which she states she plans to get. Patient states pain is in her \"bottom\" and describes it as \"cramping\". Rates pain \"5\"/10 at present and states she has been taking Tylenol or Ibuprofen with some relief. Patient reports edema is only in ankles now and reports it as mild and improved since last discussion with ACM. Patient reports baby got discharged from NICU yesterday and states he is [place] a little trouble with latching\" during breastfeeding. Patient states she has appt at [facility] office on Fri, May 29 and is hoping to get breastfeeding help then. ACM sent patient phone number for lactation at [facility] ([phone]). Patient states she has a breast pump in the home that she has been using too. Patient denies depression but reports \"occasional\" anxiety and states she is \"able to cope\". Patient has f/u with OB on 20. ACM to outreach patient again next week for follow up. Care Transitions Subsequent and Final Call    Subsequent and Final Calls  Do you have any ongoing symptoms?:  Yes  Onset of Patient-reported symptoms:   In the past 7 days  Patient-reported symptoms:  Constipation  Interventions for patient-reported symptoms:  Other  Have your medications changed?:  Yes  Patient Reports:  started Anusol cream and going to start Miralax OTC (both per OB instruction)  Do you have any questions related to your

## 2020-06-01 ENCOUNTER — TELEPHONE (OUTPATIENT)
Dept: OBGYN CLINIC | Age: 25
End: 2020-06-01

## 2020-06-01 NOTE — TELEPHONE ENCOUNTER
Pt calling to ask about BP medication. States she was told to stop taking BP medication when she was seen last but her pressures have been running 150-160 over . Delivery date 5/19/20. Please contact her. Pharmacy was confirmed.

## 2020-06-02 ENCOUNTER — POSTPARTUM VISIT (OUTPATIENT)
Dept: OBGYN CLINIC | Age: 25
End: 2020-06-02

## 2020-06-02 ENCOUNTER — HOSPITAL ENCOUNTER (OUTPATIENT)
Age: 25
Setting detail: SPECIMEN
Discharge: HOME | End: 2020-06-02
Payer: COMMERCIAL

## 2020-06-02 VITALS
HEIGHT: 69 IN | SYSTOLIC BLOOD PRESSURE: 150 MMHG | DIASTOLIC BLOOD PRESSURE: 100 MMHG | BODY MASS INDEX: 33.1 KG/M2 | WEIGHT: 223.5 LBS | HEART RATE: 79 BPM | TEMPERATURE: 97.9 F

## 2020-06-02 LAB
CREATININE URINE: 153.9 MG/DL (ref 28–217)
TOTAL PROTEIN, URINE: 24 MG/DL
URINE TOTAL PROTEIN CREATININE RATIO: 0.16 (ref 0–0.2)

## 2020-06-02 PROCEDURE — 99024 POSTOP FOLLOW-UP VISIT: CPT | Performed by: ADVANCED PRACTICE MIDWIFE

## 2020-06-02 RX ORDER — LABETALOL 200 MG/1
200 TABLET, FILM COATED ORAL 2 TIMES DAILY
Qty: 60 TABLET | Refills: 3 | Status: SHIPPED | OUTPATIENT
Start: 2020-06-02 | End: 2022-08-30

## 2020-06-02 RX ORDER — NEBULIZER AND COMPRESSOR
EACH MISCELLANEOUS
Qty: 1 KIT | Refills: 0 | Status: SHIPPED | OUTPATIENT
Start: 2020-06-02 | End: 2020-07-01 | Stop reason: RX

## 2020-06-02 NOTE — PROGRESS NOTES
Patient presents today with concerns for elevated BP  She was taken off her Procardia 30XL d/t stable BPs and pt experiencing dizziness     Patient reports some mild headaches, denies visual changes, N/V or RUQ pain. She did received Mag Sulfate during hospital admission for her delivery on 5/19/20  Baby is doing well and pt reports mild increase in bleeding, states she has been out running errands.     O:.BP (!) 150/100 (Site: Left Upper Arm, Position: Sitting, Cuff Size: Medium Adult)   Pulse 79   Temp 97.9 °F (36.6 °C) (Temporal)   Ht 5' 9\" (1.753 m)   Wt 223 lb 8 oz (101.4 kg)   LMP 08/17/2019   Breastfeeding Yes   BMI 33.01 kg/m²      A: 2 week postpartum  Elevated BP  History of Preeclampsia during pregnancy  Breast and bottle feeding    P: Discussed patient with Dr. Za Walker, plan for repeat labs today in office for signs of HELLP, since received Mag Sulfate during labor/ immediate PP will restart pt on labetalol 200mg BID since pt did not tolerate Procardia   F/u 1 week for a BP check  Discussed warning signs and when to call CNM on call to report to ER for eval    More than 50% of 15min appt was spent counseling on the above

## 2020-06-03 ENCOUNTER — CARE COORDINATION (OUTPATIENT)
Dept: OTHER | Facility: CLINIC | Age: 25
End: 2020-06-03

## 2020-06-03 NOTE — CARE COORDINATION
ACM called patient for CT f/u outreach. Patient requested ACM call back tomorrow due to now not being a good time to talk. ACM could hear lots of people talking in the background. ACM notified patient will call back tomorrow. Roland Roger RN BSN  Associate Care Manager  Phone: [phone]  Email: [email or web address]

## 2020-06-04 ENCOUNTER — CARE COORDINATION (OUTPATIENT)
Dept: OTHER | Facility: CLINIC | Age: 25
End: 2020-06-04

## 2020-06-04 ENCOUNTER — HOSPITAL ENCOUNTER (OUTPATIENT)
Age: 25
Setting detail: SPECIMEN
Discharge: HOME | End: 2020-06-04
Payer: COMMERCIAL

## 2020-06-04 LAB
ABSOLUTE EOS #: <0.03 K/UL (ref 0–0.44)
ABSOLUTE IMMATURE GRANULOCYTE: <0.03 K/UL (ref 0–0.3)
ABSOLUTE LYMPH #: 2.23 K/UL (ref 1.1–3.7)
ABSOLUTE MONO #: 0.25 K/UL (ref 0.1–1.2)
ALBUMIN SERPL-MCNC: 4.1 G/DL (ref 3.5–5.2)
ALBUMIN/GLOBULIN RATIO: 1.4 (ref 1–2.5)
ALP BLD-CCNC: 88 U/L (ref 35–104)
ALT SERPL-CCNC: 29 U/L (ref 5–33)
ANION GAP SERPL CALCULATED.3IONS-SCNC: 16 MMOL/L (ref 9–17)
AST SERPL-CCNC: 28 U/L
BASOPHILS # BLD: 0 % (ref 0–2)
BASOPHILS ABSOLUTE: <0.03 K/UL (ref 0–0.2)
BILIRUB SERPL-MCNC: 0.3 MG/DL (ref 0.3–1.2)
BUN BLDV-MCNC: 13 MG/DL (ref 6–20)
BUN/CREAT BLD: NORMAL (ref 9–20)
CALCIUM SERPL-MCNC: 9.9 MG/DL (ref 8.6–10.4)
CHLORIDE BLD-SCNC: 105 MMOL/L (ref 98–107)
CO2: 21 MMOL/L (ref 20–31)
CREAT SERPL-MCNC: 0.71 MG/DL (ref 0.5–0.9)
DIFFERENTIAL TYPE: ABNORMAL
EOSINOPHILS RELATIVE PERCENT: 0 % (ref 1–4)
GFR AFRICAN AMERICAN: >60 ML/MIN
GFR NON-AFRICAN AMERICAN: >60 ML/MIN
GFR SERPL CREATININE-BSD FRML MDRD: NORMAL ML/MIN/{1.73_M2}
GFR SERPL CREATININE-BSD FRML MDRD: NORMAL ML/MIN/{1.73_M2}
GLUCOSE BLD-MCNC: 74 MG/DL (ref 70–99)
HCT VFR BLD CALC: 34.4 % (ref 36.3–47.1)
HEMOGLOBIN: 9.8 G/DL (ref 11.9–15.1)
IMMATURE GRANULOCYTES: 0 %
LYMPHOCYTES # BLD: 40 % (ref 24–43)
MCH RBC QN AUTO: 23.8 PG (ref 25.2–33.5)
MCHC RBC AUTO-ENTMCNC: 28.5 G/DL (ref 28.4–34.8)
MCV RBC AUTO: 83.5 FL (ref 82.6–102.9)
MONOCYTES # BLD: 4 % (ref 3–12)
NRBC AUTOMATED: 0 PER 100 WBC
PDW BLD-RTO: 15.4 % (ref 11.8–14.4)
PLATELET # BLD: 444 K/UL (ref 138–453)
PLATELET ESTIMATE: ABNORMAL
PMV BLD AUTO: 9.7 FL (ref 8.1–13.5)
POTASSIUM SERPL-SCNC: 4.4 MMOL/L (ref 3.7–5.3)
RBC # BLD: 4.12 M/UL (ref 3.95–5.11)
RBC # BLD: ABNORMAL 10*6/UL
SEG NEUTROPHILS: 56 % (ref 36–65)
SEGMENTED NEUTROPHILS ABSOLUTE COUNT: 3.11 K/UL (ref 1.5–8.1)
SODIUM BLD-SCNC: 142 MMOL/L (ref 135–144)
TOTAL PROTEIN: 7.1 G/DL (ref 6.4–8.3)
URIC ACID: 8.5 MG/DL (ref 2.4–5.7)
WBC # BLD: 5.6 K/UL (ref 3.5–11.3)
WBC # BLD: ABNORMAL 10*3/UL

## 2020-06-10 ENCOUNTER — CARE COORDINATION (OUTPATIENT)
Dept: OTHER | Facility: CLINIC | Age: 25
End: 2020-06-10

## 2020-06-10 NOTE — CARE COORDINATION
[facility] Transitions Follow Up Call    6/10/2020    Patient: Mónica De La Rosa  Patient : 1995   MRN: [id number]  Reason for Admission: s/p vaginal delivery with vacuum on 20  Discharge Date: 20 RARS: Readmission Risk Score: 5       Spoke with: Lina Leslie, patient    Care Transitions Subsequent and Final Call    Subsequent and Final Calls  Do you have any ongoing symptoms?:  Yes  Onset of Patient-reported symptoms: In the past 7 days  Patient-reported symptoms:  Other  Care Transitions Interventions  Other Interventions:          ACM called patient CT f/u outreach. Patient states she received information regarding using FSA card to purchase BP cuff but states she does not have an FSA card. States she has not yet obtained BP cuff. Patient c/o daily headaches and rates current headache \"5\"/10. Patient denies nausea, denies dizziness, denies vision changes. Reports to ACM that the pelvic pain has improved and tolerable at this time. States she occasionally takes Ibuprofen for the pelvic pain. Patient reports Ibuprofen does not help the headaches. Med rec completed with patient. Patient reports next appt with OB is 20. ACM reviewed red flags of CVA and instructed patient to call 911 if she experiences such symptoms. Patient verbalized understanding. Next outreach planned for next week. ACM routed message to [facility] office to give update regarding headaches and no BP cuff. Follow Up  Future Appointments   Date Time Provider Alfa Escobedo   2020  9:30 AM BRENDAN Valle CNM [facility] Manolo Lott RN BSN  Associate Care Manager  Phone: [phone]  Email: [email or web address]

## 2020-06-12 ENCOUNTER — HOSPITAL ENCOUNTER (OUTPATIENT)
Age: 25
Setting detail: SPECIMEN
Discharge: HOME | End: 2020-06-12
Payer: COMMERCIAL

## 2020-06-12 ENCOUNTER — POSTPARTUM VISIT (OUTPATIENT)
Dept: OBGYN CLINIC | Age: 25
End: 2020-06-12

## 2020-06-12 VITALS
TEMPERATURE: 97.3 F | HEART RATE: 82 BPM | WEIGHT: 215.7 LBS | DIASTOLIC BLOOD PRESSURE: 70 MMHG | HEIGHT: 69 IN | SYSTOLIC BLOOD PRESSURE: 118 MMHG | BODY MASS INDEX: 31.95 KG/M2

## 2020-06-12 LAB
DIRECT EXAM: ABNORMAL
Lab: ABNORMAL
SPECIMEN DESCRIPTION: ABNORMAL

## 2020-06-12 PROCEDURE — 0503F POSTPARTUM CARE VISIT: CPT | Performed by: ADVANCED PRACTICE MIDWIFE

## 2020-06-12 RX ORDER — ETONOGESTREL AND ETHINYL ESTRADIOL 11.7; 2.7 MG/1; MG/1
1 INSERT, EXTENDED RELEASE VAGINAL
Qty: 3 EACH | Refills: 3 | Status: SHIPPED | OUTPATIENT
Start: 2020-06-12 | End: 2022-08-30

## 2020-06-12 RX ORDER — BUTALBITAL, ACETAMINOPHEN AND CAFFEINE 50; 325; 40 MG/1; MG/1; MG/1
1 TABLET ORAL EVERY 4 HOURS PRN
Qty: 180 TABLET | Refills: 3 | Status: SHIPPED | OUTPATIENT
Start: 2020-06-12 | End: 2022-08-30

## 2020-06-16 RX ORDER — METRONIDAZOLE 500 MG/1
500 TABLET ORAL 2 TIMES DAILY
Qty: 14 TABLET | Refills: 0 | Status: SHIPPED | OUTPATIENT
Start: 2020-06-16 | End: 2020-06-23

## 2020-06-17 ENCOUNTER — CARE COORDINATION (OUTPATIENT)
Dept: OTHER | Facility: CLINIC | Age: 25
End: 2020-06-17

## 2020-06-30 ENCOUNTER — ROUTINE PRENATAL (OUTPATIENT)
Dept: OBGYN CLINIC | Age: 25
End: 2020-06-30
Payer: COMMERCIAL

## 2020-06-30 VITALS
WEIGHT: 217.8 LBS | HEIGHT: 69 IN | DIASTOLIC BLOOD PRESSURE: 84 MMHG | SYSTOLIC BLOOD PRESSURE: 128 MMHG | TEMPERATURE: 97.2 F | BODY MASS INDEX: 32.26 KG/M2 | HEART RATE: 84 BPM

## 2020-06-30 PROBLEM — K59.09 CHRONIC CONSTIPATION: Status: ACTIVE | Noted: 2020-06-30

## 2020-06-30 PROBLEM — O09.90 HIGH-RISK PREGNANCY: Status: RESOLVED | Noted: 2020-01-02 | Resolved: 2020-06-30

## 2020-06-30 PROCEDURE — 0503F POSTPARTUM CARE VISIT: CPT | Performed by: ADVANCED PRACTICE MIDWIFE

## 2020-07-01 ENCOUNTER — CARE COORDINATION (OUTPATIENT)
Dept: OTHER | Facility: CLINIC | Age: 25
End: 2020-07-01

## 2020-07-01 NOTE — CARE COORDINATION
Ambulatory Care Coordination Note  7/1/2020  CM Risk Score: 0  Charlson 10 Year Mortality Risk Score: 2%     ACC: Emmanuel Queen, RN    Summary Note: AC called patient for CC outreach. Patient states had f/u with OB yesterday and is being referred to GI for chronic constipation. States she is having BM 1x/week. States she is waiting call from GI office to schedule initial appt. Patient reports her appetite has \"picked up\" recently. Patient reports still has regular headaches and states she takes Fioricet once daily with relief. States BP at [facility] office yesterday was 128/84. Patient states she has pelvic pressure/pain at times and describes it as \"mild\". Patient denies depression or anxiety. Patient states baby is doing good and eats 4 oz Similac Soy Q 3 hours. She states baby has wet diaper at least Q 3 hours with feeds and BM 2x/day or sometimes every other day. Patient states she spoke to pediatrician about this and was told it is normal. Patient reports baby occasionally has little spit ups after eating, denies vomiting. Patient states she lays baby on the floor and he will just lay flat but when she lays him on her chest on his belly, he will lift his head up and look around. Patient reports baby sleeps in bassinet. ACM to follow up with patient in a couple weeks to re-evaluate constipation and headaches. Care Coordination Interventions    Program Enrollment:  Complex Care  Referral from Primary Care Provider:  No  Suggested Interventions and Community Resources         Goals Addressed                 This Visit's Progress     Conditions and Symptoms        I will schedule office visits, as directed by my provider. I will keep my appointment or reschedule if I have to cancel. I will notify my provider of any barriers to my plan of care. I will follow my Zone Management tool to seek urgent or emergent care.   I will notify my provider of any symptoms that indicate a worsening of my condition. Barriers: stress  Plan for overcoming my barriers: I will work with my ACM to deal with barriers  Confidence: 6/10  Anticipated Goal Completion Date: 8/1/20 7/1/20: Patient states OB referred her to GI for chronic constipation. Patient waiting call from GI office to schedule initial appt. Prior to Admission medications    Medication Sig Start Date End Date Taking? Authorizing Provider   butalbital-acetaminophen-caffeine (FIORICET, ESGIC) -66 MG per tablet Take 1 tablet by mouth every 4 hours as needed for Headaches 6/12/20  Yes BRENDAN Kamara CNM   etonogestrel-ethinyl estradiol (NUVARING) 0.12-0.015 MG/24HR vaginal ring Place 1 each vaginally every 21 days Insert one (1) ring vaginally and leave in place for three (3) weeks, then remove for one (1) week. 6/12/20  Yes BRENDAN Kamara CNM   labetalol (NORMODYNE) 200 MG tablet Take 1 tablet by mouth 2 times daily 6/2/20  Yes BRENDAN Zuleta CNM   docusate sodium (COLACE, DULCOLAX) 100 MG CAPS Take 100 mg by mouth 2 times daily 5/19/20  Yes Gee Vergara DO   acetaminophen (TYLENOL) 325 MG tablet Take 650 mg by mouth every 6 hours as needed for Pain   Yes Historical Provider, MD   famotidine (PEPCID) 40 MG tablet Take 40 mg by mouth as needed    Yes Historical Provider, MD   Prenatal Vit-Fe Fumarate-FA (PRENATAL VITAMIN PO) Take by mouth   Yes Historical Provider, MD   hydrocortisone (ANUSOL-HC) 2.5 % CREA rectal cream Apply twice daily.   Patient not taking: Reported on 6/12/2020 5/27/20   BRENDAN Kamara CNM   ibuprofen (ADVIL;MOTRIN) 800 MG tablet Take 1 tablet by mouth every 8 hours  Patient not taking: Reported on 6/30/2020 5/19/20   Gee Vergara DO   ferrous sulfate (FE TABS) 325 (65 Fe) MG EC tablet Take 1 tablet by mouth daily (with breakfast)  Patient not taking: Reported on 6/30/2020 5/19/20   Gee Vergara DO       Plan:  -Patient waiting on call from GI office to schedule initial appt for chronic constipation.    -Patient continues to have regular headaches that are controlled with Fioricet. Will recheck at next outreach. Anuradha Sutherland RN BSN  Associate Care Manager  Phone: [phone]  Email: [email or web address]

## 2020-07-14 ENCOUNTER — TELEPHONE (OUTPATIENT)
Dept: OBGYN CLINIC | Age: 25
End: 2020-07-14

## 2020-07-14 NOTE — TELEPHONE ENCOUNTER
Pt called requesting we complete a return to work form. Planning to return 07/19/20. She will drop it off. Completed form needs to be faxed and sent to pt through [address]

## 2020-07-16 ENCOUNTER — HOSPITAL ENCOUNTER (OUTPATIENT)
Age: 25
Setting detail: SPECIMEN
Discharge: HOME | End: 2020-07-16
Payer: COMMERCIAL

## 2020-07-16 ENCOUNTER — TELEPHONE (OUTPATIENT)
Dept: OBGYN CLINIC | Age: 25
End: 2020-07-16

## 2020-07-16 ENCOUNTER — OFFICE VISIT (OUTPATIENT)
Dept: PRIMARY CARE CLINIC | Age: 25
End: 2020-07-16
Payer: COMMERCIAL

## 2020-07-16 VITALS
SYSTOLIC BLOOD PRESSURE: 130 MMHG | OXYGEN SATURATION: 98 % | HEIGHT: 69 IN | BODY MASS INDEX: 32.29 KG/M2 | DIASTOLIC BLOOD PRESSURE: 88 MMHG | WEIGHT: 218 LBS | HEART RATE: 74 BPM | TEMPERATURE: 97.8 F

## 2020-07-16 PROBLEM — R03.0 FINDING OF ABOVE NORMAL BLOOD PRESSURE: Status: ACTIVE | Noted: 2020-07-16

## 2020-07-16 PROCEDURE — G8417 CALC BMI ABV UP PARAM F/U: HCPCS | Performed by: NURSE PRACTITIONER

## 2020-07-16 PROCEDURE — G8427 DOCREV CUR MEDS BY ELIG CLIN: HCPCS | Performed by: NURSE PRACTITIONER

## 2020-07-16 PROCEDURE — 1036F TOBACCO NON-USER: CPT | Performed by: NURSE PRACTITIONER

## 2020-07-16 PROCEDURE — 99203 OFFICE O/P NEW LOW 30 MIN: CPT | Performed by: NURSE PRACTITIONER

## 2020-07-16 ASSESSMENT — ENCOUNTER SYMPTOMS
SORE THROAT: 1
VOICE CHANGE: 0
RHINORRHEA: 0
DIARRHEA: 0
EYE PAIN: 0
COUGH: 1
CHEST TIGHTNESS: 1
ABDOMINAL PAIN: 0
TROUBLE SWALLOWING: 0
ABDOMINAL DISTENTION: 0
VOMITING: 0
EYE REDNESS: 0
SHORTNESS OF BREATH: 0
NAUSEA: 0

## 2020-07-16 NOTE — PROGRESS NOTES
Pt is here for cough, HA, chills, body aches, and sore throat. Sx started yesterday. Pt is 8 weeks PP.

## 2020-07-16 NOTE — TELEPHONE ENCOUNTER
Spoke w/ pt regarding return to work note. States she did not drop it off because she has developed COVID like symptoms. Pt is trying to figure out where she can go for testing. States she has call the [facility] testing sites and the earliest appt she can get is 7/22/20. Encouraged pt to call the [facility] in [facility]. Also encouraged pt to email her return to work form to me rather than bring it into the office. Voiced understanding.

## 2020-07-16 NOTE — PATIENT INSTRUCTIONS
Learning About Coronavirus [phone])  Coronavirus [phone]): Overview  What is coronavirus (COVID-19)? The coronavirus disease (COVID-19) is caused by a virus. It is an illness that was first found in [place], [place], in December 2019. It has since spread worldwide. The virus can cause fever, cough, and trouble breathing. In severe cases, it can cause pneumonia and make it hard to breathe without help. It can cause death. Coronaviruses are a large group of viruses. They cause the common cold. They also cause more serious illnesses like Middle East respiratory syndrome (MERS) and severe acute respiratory syndrome (SARS). COVID-19 is caused by a novel coronavirus. That means it's a new type that has not been seen in people before. This virus spreads person-to-person through droplets from coughing and sneezing. It can also spread when you are close to someone who is infected. And it can spread when you touch something that has the virus on it, such as a doorknob or a tabletop. What can you do to protect yourself from coronavirus (COVID-19)? The best way to protect yourself from getting sick is to:  · Avoid areas where there is an outbreak. · Avoid contact with people who may be infected. · Wash your hands often with soap or alcohol-based hand sanitizers. · Avoid crowds and try to stay at least 6 feet away from other people. · Wash your hands often, especially after you cough or sneeze. Use soap and water, and scrub for at least 20 seconds. If soap and water aren't available, use an alcohol-based hand . · Avoid touching your mouth, nose, and eyes. What can you do to avoid spreading the virus to others? To help avoid spreading the virus to others:  · Cover your mouth with a tissue when you cough or sneeze. Then throw the tissue in the trash. · Use a disinfectant to clean things that you touch often. · Wear a cloth face cover if you have to go to public areas.   · Stay home if you are sick or have been exposed to the virus. Don't go to school, work, or public areas. And don't use public transportation. · If you are sick:  ? Leave your home only if you need to get medical care. But call the doctor's office first so they know you're coming. And wear a face cover. ? Wear the face cover whenever you're around other people. It can help stop the spread of the virus when you cough or sneeze. ? Clean and disinfect your home every day. Use household  and disinfectant wipes or sprays. Take special care to clean things that you grab with your hands. These include doorknobs, remote controls, phones, and handles on your refrigerator and microwave. And don't forget countertops, tabletops, bathrooms, and computer keyboards. When to call for help  [id number] anytime you think you may need emergency care. For example, call if:  · You have severe trouble breathing. (You can't talk at all.)  · You have constant chest pain or pressure. · You are severely dizzy or lightheaded. · You are confused or can't think clearly. · Your face and lips have a blue color. · You pass out (lose consciousness) or are very hard to wake up. Call your doctor now if you develop symptoms such as:  · Shortness of breath. · Fever. · Cough. If you need to get care, call ahead to the doctor's office for instructions before you go. Make sure you wear a face cover to prevent exposing other people to the virus. Where can you get the latest information? The following health organizations are tracking and studying this virus. Their websites contain the most up-to-date information. Jenhilary Castillo also learn what to do if you think you may have been exposed to the virus. · U.S. Centers for Disease Control and Prevention (CDC): The CDC provides updated news about the disease and travel advice. The website also tells you how to prevent the spread of infection.  www.cdc.gov  · World Health Organization [facility]): WHO offers information about the virus outbreaks. WHO also has travel advice. www.who.int  Current as of: April 24, 2020               Content Version: 12.4  © 2006-2020 Healthwise, Incorporated. Care instructions adapted under license by your healthcare professional. If you have questions about a medical condition or this instruction, always ask your healthcare professional. [address] any warranty or liability for your use of this information. Coronavirus ([id number]): Care Instructions  Overview  The coronavirus disease (COVID-19) is caused by a virus. It causes a fever, a cough, and shortness of breath. It mainly spreads person-to-person through droplets from coughing and sneezing. The virus also can spread when people are in close contact with someone who is infected. Most people have mild symptoms and can take care of themselves at home. If their symptoms get worse, they may need care in a hospital. There is no medicine to fight the virus. It's important to not spread the virus to others. If you have COVID-19, wear a face cover anytime you are around other people. You need to isolate yourself while you are sick. Your doctor will tell you when you no longer need to be isolated. Leave your home only if you need to get medical care. Follow-up care is a key part of your treatment and safety. Be sure to make and go to all appointments, and call your doctor if you are having problems. It's also a good idea to know your test results and keep a list of the medicines you take. How can you care for yourself at home? · Get extra rest. It can help you feel better. · Drink plenty of fluids. This helps replace fluids lost from fever. Fluids also help ease a scratchy throat. Water, soup, fruit juice, and hot tea with lemon are good choices. · Take acetaminophen (such as Tylenol) to reduce a fever. It may also help with muscle aches. Read and follow all instructions on the label.   · Sponge your body with lukewarm water to help with fever. Don't use cold water or ice. · Use petroleum jelly on sore skin. This can help if the skin around your nose and lips becomes sore from rubbing a lot with tissues. Tips for isolation  · Wear a cloth face cover when you are around other people. It can help stop the spread of the virus when you cough or sneeze. · Limit contact with people in your home. If possible, stay in a separate bedroom and use a separate bathroom. · Avoid contact with pets and other animals. · Cover your mouth and nose with a tissue when you cough or sneeze. Then throw it in the trash right away. · Wash your hands often, especially after you cough or sneeze. Use soap and water, and scrub for at least 20 seconds. If soap and water aren't available, use an alcohol-based hand . · Don't share personal household items. These include bedding, towels, cups and glasses, and eating utensils. · Clean and disinfect your home every day. Use household  and disinfectant wipes or sprays. Take special care to clean things that you grab with your hands. These include doorknobs, remote controls, phones, and handles on your refrigerator and microwave. And don't forget countertops, tabletops, bathrooms, and computer keyboards. When should you call for help? [id number] anytime you think you may need emergency care. For example, call if you have life-threatening symptoms, such as:  · You have severe trouble breathing. (You can't talk at all.)  · You have constant chest pain or pressure. · You are severely dizzy or lightheaded. · You are confused or can't think clearly. · Your face and lips have a blue color. · You pass out (lose consciousness) or are very hard to wake up. Call your doctor now or seek immediate medical care if:  · You have moderate trouble breathing. (You can't speak a full sentence.)  · You are coughing up blood (more than about 1 teaspoon). · You have signs of low blood pressure.  These include feeling

## 2020-07-20 ENCOUNTER — CARE COORDINATION (OUTPATIENT)
Dept: OTHER | Facility: CLINIC | Age: 25
End: 2020-07-20

## 2020-07-22 ENCOUNTER — TELEPHONE (OUTPATIENT)
Dept: OBGYN CLINIC | Age: 25
End: 2020-07-22

## 2020-07-22 LAB — SARS-COV-2, NAA: NOT DETECTED

## 2020-07-22 NOTE — CARE COORDINATION
ACM called patient for CC outreach. No answer; ACM left HIPAA compliant voicemail message with request for return call. ACM will continue to follow. ACM also sent message to patient via [employer]. Lavern Pena RN BSN  Associate Care Manager  Phone: [phone]  Email: [email or web address]

## 2020-07-23 ENCOUNTER — HOSPITAL ENCOUNTER (OUTPATIENT)
Age: 25
Setting detail: SPECIMEN
Discharge: HOME | End: 2020-07-23
Payer: COMMERCIAL

## 2020-07-23 ENCOUNTER — OFFICE VISIT (OUTPATIENT)
Dept: OBGYN CLINIC | Age: 25
End: 2020-07-23
Payer: COMMERCIAL

## 2020-07-23 ENCOUNTER — CARE COORDINATION (OUTPATIENT)
Dept: OTHER | Facility: CLINIC | Age: 25
End: 2020-07-23

## 2020-07-23 VITALS
WEIGHT: 219.5 LBS | HEIGHT: 69 IN | HEART RATE: 83 BPM | DIASTOLIC BLOOD PRESSURE: 78 MMHG | SYSTOLIC BLOOD PRESSURE: 122 MMHG | BODY MASS INDEX: 32.51 KG/M2 | TEMPERATURE: 97.2 F

## 2020-07-23 PROCEDURE — 99395 PREV VISIT EST AGE 18-39: CPT | Performed by: ADVANCED PRACTICE MIDWIFE

## 2020-07-23 RX ORDER — ETONOGESTREL AND ETHINYL ESTRADIOL 11.7; 2.7 MG/1; MG/1
1 INSERT, EXTENDED RELEASE VAGINAL
Qty: 3 EACH | Refills: 4 | Status: SHIPPED | OUTPATIENT
Start: 2020-07-23 | End: 2022-08-30 | Stop reason: SDUPTHER

## 2020-07-23 ASSESSMENT — ENCOUNTER SYMPTOMS
RESPIRATORY NEGATIVE: 1
ALLERGIC/IMMUNOLOGIC NEGATIVE: 1
EYES NEGATIVE: 1
GASTROINTESTINAL NEGATIVE: 1

## 2020-07-23 NOTE — PROGRESS NOTES
Date of Visit:  2020  Patient :  1995     Subjective:     CHIEF COMPLAINT:     Chief Complaint   Patient presents with    Annual Exam     Last Pap 10/31/2019       HPI  Here for annual exam. Stopped taking labetalol, was not dx with chtn. Is on nuvaring is tolerating well would like to continue. BP WNL. Her bowel habits are irregular. Reports continued constipation. She denies any bloating. She denies dysuria. She complains of urinary leaking, during exercise and when coughing. She denies vaginal discharge. She is sexually active with single partner, contraception - NuvaRing vaginal inserts. Is requesting STD screening, [facility] only  Is formula feeding ,baby is doing well    Depression  2 question Screen:  Over the past two weeks, has the patient felt down,depressed or hopeless? No  Over the past two weeks, has the patient felt little interest or pleasure in doing things? No    PREVENTIVE HEALTH SCREENING:   Date of last pap: 10/2019                 HPV typing/date :10/2019  negative  Abnormal pap smear history: no    Date of last mammogram: n/a   Date of last DEXA scan: n/a  Date of last colonoscopy: n/a    History of Gestational Diabetes: No    Preventive screening: No    Family history of Breast, Ovarian, Colon or Uterine Cancer:  yes     If Yes see scanned worksheet    Objective:   GYNECOLOGIC HISTORY:   LMP:  Patient's last menstrual period was 2020 (exact date).   Monthly menses (days): 28 days  Length: 6 days  Flow: Heavy    Menopause: n/a  Hormone Replacement: no    Sexually active: Yes  HPV vaccine: yes    STD history: Yes    Birth control method :Yes  Permanent Sterilization: No    SOCIALHISTORY:  Seat Belt Use: Yes  Domestic Violence: No    Counseling: No  Regular exercise: Yes   Counseling:No     Diet discussed: Yes    Social History     Tobacco Use   Smoking Status Never Smoker   Smokeless Tobacco Never Used     Social History     Substance and Sexual Activity   Alcohol Use Yes    Comment: social     Social History     Substance and Sexual Activity   Drug Use Never       Current Outpatient Medications   Medication Sig Dispense Refill    etonogestrel-ethinyl estradiol (NUVARING) 0.12-0.015 MG/24HR vaginal ring Place 1 each vaginally every 21 days Insert one (1) ring vaginally and leave in place for three (3) weeks, then remove for one (1) week. 3 each 4    butalbital-acetaminophen-caffeine (FIORICET, ESGIC) -40 MG per tablet Take 1 tablet by mouth every 4 hours as needed for Headaches 180 tablet 3    etonogestrel-ethinyl estradiol (NUVARING) 0.12-0.015 MG/24HR vaginal ring Place 1 each vaginally every 21 days Insert one (1) ring vaginally and leave in place for three (3) weeks, then remove for one (1) week. 3 each 3    acetaminophen (TYLENOL) 325 MG tablet Take 650 mg by mouth every 6 hours as needed for Pain      labetalol (NORMODYNE) 200 MG tablet Take 1 tablet by mouth 2 times daily (Patient not taking: Reported on 7/23/2020) 60 tablet 3    hydrocortisone (ANUSOL-HC) 2.5 % CREA rectal cream Apply twice daily. (Patient not taking: Reported on 6/12/2020) 1 Tube 1    ibuprofen (ADVIL;MOTRIN) 800 MG tablet Take 1 tablet by mouth every 8 hours (Patient not taking: Reported on 6/30/2020) 30 tablet 0    docusate sodium (COLACE, DULCOLAX) 100 MG CAPS Take 100 mg by mouth 2 times daily (Patient not taking: Reported on 7/16/2020) 60 capsule 0    ferrous sulfate (FE TABS) 325 (65 Fe) MG EC tablet Take 1 tablet by mouth daily (with breakfast) (Patient not taking: Reported on 7/23/2020) 30 tablet 1    famotidine (PEPCID) 40 MG tablet Take 40 mg by mouth as needed       Prenatal Vit-Fe Fumarate-FA (PRENATAL VITAMIN PO) Take by mouth       No current facility-administered medications for this visit. REVIEW OF SYSTEMS:  Review of Systems   Constitutional: Negative. HENT: Negative. Eyes: Negative. Respiratory: Negative. Cardiovascular: Negative. Gastrointestinal: Negative. Endocrine: Negative. Musculoskeletal: Negative. Skin: Negative. Allergic/Immunologic: Negative. Neurological: Negative. Hematological: Negative. Psychiatric/Behavioral: Negative. Physical Exam:     Constitutional:   Blood pressure 122/78, pulse 83, temperature 97.2 °F (36.2 °C), temperature source Temporal, height 5' 9\" (1.753 m), weight 219 lb 8 oz (99.6 kg), last menstrual period 06/25/2020, not currently breastfeeding. Wt Readings from Last 3 Encounters:   07/23/20 219 lb 8 oz (99.6 kg)   07/16/20 218 lb (98.9 kg)   06/30/20 217 lb 12.8 oz (98.8 kg)       General Appearance: This is a well-developed, well-nourished and well-groomed female    Skin:  Inspection of the skin revealed no rashes or lesions    Neck and EENT:  No eye discharge and sclera non-icteric  Lips, teeth and gums without lesions and normal dentition  Nares are patent without discharge  Normal external ears are present with no hearing loss  The neck was supple with full range of motion and no masses. The thyroid was not enlarged and had no masses. No enlarged cervical lymph nodes    Respiratory: The lungs were clear to auscultation bilaterally. There were no rales, rhonchi or wheezes. There was good respiratory effort. Cardiovascular: The heart was in a regular rhythm and rate was normal.  No murmur or extra sounds were noted. Breast:  The breasts are normal size and symmetrical.  There are no skin changes with position changes. The nipples are without deviations or discharge. No masses were palpated. No axillary or supraclavicular lymphadenopathy is present. Back:  Straight with no CVA tenderness present    Abdomen: The abdomen is soft and non-tender. There was no guarding, rebound or rigidity. The bladder was without fullness or tenderness. No hernias were appreciated. Pelvic: The external genitalia has a normal appearance without masses or lesions.   There is Tobacco & Secondary smoke risks discussed; with recommendation for cessation and avoidance. Routine health maintenance per patients PCP discussed. Referral to PCPs given        Patient was seen with total face to face time of 25 minutes. More than 50% of this visit was counseling and education regarding    Diagnosis Orders   1. Women's annual routine gynecological examination     2. Stress incontinence of urine  [facility] [place]   3. STD exposure  Chlamydia Trachomatis & Neisseria gonorrhoeae (GC) by amplified detection   4. Encounter for surveillance of vaginal ring hormonal contraceptive device  etonogestrel-ethinyl estradiol (NUVARING) 0.12-0.015 MG/24HR vaginal ring   5.  Menorrhagia with regular cycle  etonogestrel-ethinyl estradiol (NUVARING) 0.12-0.015 MG/24HR vaginal ring       Electronically signed by Christie Schulz on 7/23/20 at 2:22 PM EDT

## 2020-07-23 NOTE — CARE COORDINATION
ACM called patient for CC outreach. No answer. ACM left HIPAA compliant voicemail message with request for return call. ACM also mailed lost to follow up letter to patient. Will continue to follow. Mojgan Babin RN BSN  Associate Care Manager  Phone: [phone]  Email: [email or web address]

## 2020-07-23 NOTE — LETTER
Dear Martina Olvera,      I have been trying to reach you for a follow-up call for services with our Associate Care Management Program. Your well being is very important to us. With continued partnership in the [facility] program, we hope to work with you to optimize your health and work toward reaching your health goals. I look forward to continuing to work with you. Please contact me at your convenience and we can schedule a time that works best for you. You can reach me at my direct line [phone]. Have a blessed day,      Jf Rivera RN BSN  Associate Care Manager  [phone]  [email or web address]

## 2020-07-24 LAB
C TRACH DNA GENITAL QL NAA+PROBE: NEGATIVE
N. GONORRHOEAE DNA: NEGATIVE
SPECIMEN DESCRIPTION: NORMAL

## 2020-07-30 ENCOUNTER — HOSPITAL ENCOUNTER (OUTPATIENT)
Dept: PHYSICAL THERAPY | Facility: CLINIC | Age: 25
Setting detail: THERAPIES SERIES
Discharge: HOME | End: 2020-07-30
Payer: COMMERCIAL

## 2020-07-31 ENCOUNTER — CARE COORDINATION (OUTPATIENT)
Dept: OTHER | Facility: CLINIC | Age: 25
End: 2020-07-31

## 2020-07-31 NOTE — CARE COORDINATION
Ambulatory Care Coordination Note  7/31/2020  CM Risk Score: 0  Charlson 10 Year Mortality Risk Score: 2%     ACC: Earle Franks RN    Summary Note: ACM called patient for CC outreach. Patient states she is \"doing pretty good\". States she still needs initial appt with GI and that she has not had a chance to call them to schedule. States GI referral sent per OB on 6/30/20. ACM called GI to schedule and was told no referral received. ACM called patient to get OB phone number to call for GI referral. Patient states OB office is closed on Friday. ACM offered to call OB on Mon; patient states she will call OB on Monday. Patient denies pain. Denies feelings of anxiety or depression. States she has returned to work on 7/27/20 and is going well. Patient denies other needs or concerns at this time. ACM to continue to follow. Care Coordination Interventions    Program Enrollment:  Complex Care  Referral from Primary Care Provider:  No  Suggested Interventions and Community Resources         Goals Addressed                 This Visit's Progress     Conditions and Symptoms   Worsening     I will schedule office visits, as directed by my provider. I will keep my appointment or reschedule if I have to cancel. I will notify my provider of any barriers to my plan of care. I will follow my Zone Management tool to seek urgent or emergent care. I will notify my provider of any symptoms that indicate a worsening of my condition. Barriers: stress  Plan for overcoming my barriers: I will work with my ACM to deal with barriers  Confidence: 6/10  Anticipated Goal Completion Date: 8/1/20 7/1/20: Patient states OB referred her to GI for chronic constipation. Patient waiting call from GI office to schedule initial appt. 7/31/20: Patient to call OB on Mon to request GI referral to be sent to GI office.              Plan:    -Patient to call OB and request GI referral sent to GI office  -Patient to get initial appt with GI scheduled    Graciela Pizarro RN BSN  Associate Care Manager  Phone: [phone]  Email: [email or web address]

## 2020-08-07 ENCOUNTER — CARE COORDINATION (OUTPATIENT)
Dept: OTHER | Facility: CLINIC | Age: 25
End: 2020-08-07

## 2020-08-07 NOTE — CARE COORDINATION
ACM called patient for CC outreach. No answer. ACM left HIPAA compliant voicemail message with request for return call. Will continue to follow. Ga Langston RN BSN  Associate Care Manager  Phone: [phone]  Email: [email or web address]

## 2020-08-11 ENCOUNTER — HOSPITAL ENCOUNTER (OUTPATIENT)
Dept: PHYSICAL THERAPY | Facility: CLINIC | Age: 25
Setting detail: THERAPIES SERIES
Discharge: HOME | End: 2020-08-11
Payer: COMMERCIAL

## 2020-08-11 PROCEDURE — 97110 THERAPEUTIC EXERCISES: CPT

## 2020-08-11 PROCEDURE — 97161 PT EVAL LOW COMPLEX 20 MIN: CPT

## 2020-08-16 NOTE — CONSULTS
[] [facility] &  Therapy  [address]  [phone]  F: [phone] [] [address]  [facility]   [address]  P: [phone]  F: [phone] [x] [address] &  Therapy  [address]  P: [phone]  F: [phone] [] [address]  [facility]   [address]   [place]: [phone]  F: [phone]      Physical Therapy General Evaluation    Date:  2020  Patient: Sergo Welch  : 1995  MRN: [id number]  Physician: [address]: MMO(unlimited per [place])  Medical Diagnosis: Stress Incontinence of urine    Rehab Codes: M62.50, N39.3, N94.1  Onset Date: 2020                                  Next 's appt: PRN    Subjective:   CC:Pt is a 20 yo with complaints of stress incontinence and occasional dyspareunia. Pt has had 3 vaginal births. Pt states symptoms have progressively worsened over time. Pt states with any exercise or sneeze, she is not able to hold her urine. HPI: (onset date: 2020)    PMHx: [x] Unremarkable [] Diabetes [] HTN  [] Pacemaker   [] MI/Heart Problems [] Cancer [] Arthritis [] Other:              [x] Refer to full medical chart  In EPIC       Comorbidities:   [] Obesity [] Dialysis  [] Other:   [] Asthma/COPD [] Dementia [] Other:   [] Stroke [] Sleep apnea [] Other:   [] Vascular disease [] Rheumatic disease [] Other:     Tests: [] X-Ray: [] MRI:  [] Other:    Medications: [x] Refer to full medical record [] None [] Other:  Allergies:      [x] Refer to full medical record [] None [] Other:    Function:  Hand Dominance  [x]? ? Right  []? ? Left  [address] [place] Status [x]? ?  Normal duty   []? ? Light duty   []? ? Off due to condition    []? ?  Retired   []? ? Not employed   []? ? Disability  []? ? Other:  []??  Return to work:    Work sufficient. Will continue to monitor c/o pain and if further treatment is warranted. Problems:   [x]???? ? Pain:  []???? ? ROM:  [x]???? ? Strength:  [x]???? ? Function:  [x]???? Other:     STG: (to be met in 4 treatments)  1. Able to isolate PF musculature  2. ? Strength:PF 3/5  3. ? Function: No reports of leaking with cough, sneeze or laugh. 4. Patient to be independent with home exercise program as demonstrated by performance with correct form without cues. LTG: (to be met in 8 treatments)  1. PF 4/5  2. Able to resume intercourse without pain or difficulty. 3. Able to perform all advanced ADL's without leaking     Patient goals: Strengthen pelvic floor/leakage     Rehab Potential:  [x]? ? Good  []? ? Fair  []? ? Poor              Suggested Professional Referral:  [x]? ? No  []? ? Yes:  Barriers to Goal Achievement:  [x]? ? No  []? ? Yes:  Domestic Concerns:  [x]? ? No  []? ? Yes:     Pt. Education:  [x]? ? Plans/Goals, Risks/Benefits discussed  [x]? ? Home exercise program  Method of Education: [x]? ? Verbal  [x]? ? Demo  [x]? ? Written (PF ex, knack ex, urine stop test)  Comprehension of Education:  [x]? ? Verbalizes understanding. [x]? ? Demonstrates understanding.  []?? Needs Review. []?? Demonstrates/verbalizes understanding of HEP/Ed previously given.     Treatment Plan:  [x]? ? Therapeutic Exercise   03391             []? ? Iontophoresis: 4 mg/mL Dexamethasone Sodium Phosphate  mAmin  26294   []? ? Therapeutic Activity  76191 []? ? Vasopneumatic cold with compression  18119               []? Auther Walter  [address] [x]? [address]  [address]   [x]? ? Neuromuscular Re-education  [id number] [x]? ? Electrical Stimulation Unattended  [id number]   [x]? ?[address]  [address] []? ? Electrical Stimulation Attended  [id number]   [x]? ? Instruction in HEP       []? ? Lumbar/Cervical Traction  [id number]   []? Chucky Barron  [id number] [x]? ? Cold/hotpack     []? ?Maia Correia   [id number]      []? ? Dry Needling, 1 or 2 muscles  71219 [x]?? Biofeedback, first 15 minutes   44916  []? ? Biofeedback, additional 15 minutes   [id number] []? ? Dry Needling, 3 or more muscles  76709      Frequency:  1 x/week for 8 visits     Todays Treatment:  Modalities:  Precautions:  Exercises:  Exercise Reps/ Time Weight/ Level Comments   Pelvic model explanation         Urine stop test         PF ex: long and quick         Th knack ex            Specific Instructions for next treatment:Biofeedback, e-stim based on testing,  TA ex     Evaluation Complexity:  History (Personal factors, comorbidities) [x]? ? 0 []?? 1-2 []?? 3+   Exam (limitations, restrictions) [x]? ? 1-2 []?? 3 []? ? 4+   Clinical presentation (progression) [x]? ? Stable []? ? Evolving  []? ? Unstable   Decision Making [x]? ? Low []? ? Moderate []? ? High     [x]? ? Low Complexity []? ? Moderate Complexity []? ? High Complexity         Treatment Charges: Mins Units   [x]? ? Evaluation       [x]? ?  Low       []? ?  Moderate       []? ?  High 20 1   []? ?  Modalities:        [x]? ?  Ther Exercise 15 1   []? ?  Manual Therapy       []? ?  Ther Activities       []? ?  Aquatics       []? ?  Vasocompression       []? ?  Other          TOTAL TREATMENT TIME: 35     Time in:0900    Time out:0940     Electronically signed Dwayne Barkley PT     Physician Signature:________________________________Date:__________________  By signing above or cosigning this note, I have reviewed this plan of care and certify a need for medically necessary rehabilitation services.      *PLEASE SIGN ABOVE AND FAX BACK ALL PAGES*

## 2020-08-19 ENCOUNTER — CARE COORDINATION (OUTPATIENT)
Dept: OTHER | Facility: CLINIC | Age: 25
End: 2020-08-19

## 2020-08-19 NOTE — CARE COORDINATION
Ambulatory Care Coordination Note  8/19/2020  CM Risk Score: 0  Charlson 10 Year Mortality Risk Score: 2%     ACC: Mojgan Babin RN    Summary Note: Associate Care Manager (ACM) called patient for CC outreach. Patient reports she is doing fine. States she did not get appointment with GI but states she is no longer having constipation. Patient reports things are going well since returning to work. Denies concerns or needs at this time. Patient denies feelings of depression or anxiety. Denies problems with BP. ACM signing off due to no further needs. Patient verbalized understanding and agreement to plan. Care Coordination Interventions    Program Enrollment:  Complex Care  Referral from Primary Care Provider:  No  Suggested Interventions and Community Resources         Goals Addressed                 This Visit's Progress     COMPLETED: Conditions and Symptoms        I will schedule office visits, as directed by my provider. I will keep my appointment or reschedule if I have to cancel. I will notify my provider of any barriers to my plan of care. I will follow my Zone Management tool to seek urgent or emergent care. I will notify my provider of any symptoms that indicate a worsening of my condition. Barriers: stress  Plan for overcoming my barriers: I will work with my ACM to deal with barriers  Confidence: 6/10  Anticipated Goal Completion Date: 8/1/20 7/1/20: Patient states OB referred her to GI for chronic constipation. Patient waiting call from GI office to schedule initial appt. 7/31/20: Patient to call OB on Mon to request GI referral to be sent to GI office. 8/19/20: Patient states she never got appt with GI; denies constipation at this time. Mojgan Babin RN BSN  Associate Care Manager  Phone: [phone]  Email: [email or web address]

## 2020-08-20 ENCOUNTER — HOSPITAL ENCOUNTER (OUTPATIENT)
Dept: PHYSICAL THERAPY | Facility: CLINIC | Age: 25
Setting detail: THERAPIES SERIES
Discharge: HOME | End: 2020-08-20
Payer: COMMERCIAL

## 2020-08-20 PROCEDURE — G0283 ELEC STIM OTHER THAN WOUND: HCPCS

## 2020-08-20 PROCEDURE — 97110 THERAPEUTIC EXERCISES: CPT

## 2020-08-20 PROCEDURE — 90912 BFB TRAINING 1ST 15 MIN: CPT

## 2020-09-07 NOTE — FLOWSHEET NOTE
[] [facility] &  Therapy  [address]  [phone]  F: [phone] [] [email or web address]  [facility]   [address]  P: [phone]  F: [phone] [x] [address] &  Therapy  [address] [facility]  P: [phone]  F: [phone] [] [address]  [facility]   [address]   [place]: [phone]  F: [phone]      Physical Therapy Daily Treatment Note    Date:  2020  Patient Name:  Sha Vicente    :  1995  MRN: [id number]  Physician: [address]: MMO(unlimited per [place])  Medical Diagnosis: Stress Incontinence of urine                   Rehab Codes: M62.50, N39.3, N94.1  Onset Date: 2020                                  Next 's appt: PRN    Visit# / total visits: 2/8   Cancels/No Shows:     Subjective:    Pain:  [] Yes  [x] No Location:  N/A Pain Rating: (0-10 scale) 0/10  Pain altered Tx:  [x] No  [] Yes  Action:  Comments: Pt states she has noted a little improvement and states she feels like she has a little better control. Objective:  Modalities:   Precautions:  Exercises:  Exercise Reps/ Time Weight/ Level Comments   Pelvic model explanation         Urine stop test         PF ex: long and quick          ann ex              Treatment Charges: Mins Units   [x]  Modalities: ES 15 1   [x]  Ther Exercise 10 1   []  Manual Therapy     []  Ther Activities     []  Aquatics     []  Vasocompression     [x]  Other: BF 15 1   Total Treatment time 40 3       Assessment: [x] Progressing toward goals. [] No change. [x] Other: Hooked pt up to biofeedback with internal sensor. Pt with min elevated resting tone. Noted hold time of about 2-3 seconds then muscular reengagement to attempt to maintain the contraction. Fair control noted.  Able to isolate PF musculature. Worked with [employer] for improved understanding of force closure, hold time and control. Improved understanding after. Continued with ES for PF after biofeedback for increased motor unit recruitment for strength improvement. 15 min with 5:10 ratio. Pt assisted with contraction or last 10 minutes of the full cycle. Will have pt return in about 2-3 weeks for a recheck and advancement. STG: (to be met in 4 treatments)  1. Able to isolate PF musculature  2. ? Strength:PF 3/5  3. ? Function: No reports of leaking with cough, sneeze or laugh. 4. Patient to be independent with home exercise program as demonstrated by performance with correct form without cues. LTG: (to be met in 8 treatments)  1. PF 4/5  2. Able to resume intercourse without pain or difficulty. 3. Able to perform all advanced ADL's without leaking     Patient goals: Strengthen pelvic floor/leakage       Pt. Education:  [x] Yes  [] No  [x] Reviewed Prior HEP/Ed  Method of Education: [x] Verbal  [x] Demo  [] Written  Comprehension of Education:  [x] Verbalizes understanding. [x] Demonstrates understanding. [] Needs review. [] Demonstrates/verbalizes HEP/Ed previously given. Plan: [x] Continue current frequency toward long and short term goals. [x] Specific Instructions for subsequent treatments: Biofeedback, ES, bridges and clam shell ex.       Time In: 0900           Time Out: 0945    Electronically signed by:  Joanna Leahy, PT

## 2020-09-09 ENCOUNTER — HOSPITAL ENCOUNTER (OUTPATIENT)
Dept: PHYSICAL THERAPY | Facility: CLINIC | Age: 25
Setting detail: THERAPIES SERIES
Discharge: HOME | End: 2020-09-09
Payer: COMMERCIAL

## 2020-09-09 NOTE — FLOWSHEET NOTE
[] [address]  [address]    [phone]  F: [phone]   [] [address]  [facility] 36   [address]  P: [phone]  F: [phone]  [x] [place]  [address] [facility]  P: [phone]  F: [phone]  [] [address] [facility] [address]   [place]: [phone]  F: [phone]   [] [address]  [place]: [phone]   F: [phone]     Physical Therapy Cancel/No Show note    Date: 2020  Patient: Teri Adame  : 1995  MRN: [id number]    Cancels/No Shows to date: 2 cx / 0 ns  For today's appointment patient:    [x]  Cancelled    [] Rescheduled appointment    [] No-show     Reason given by patient:    []  Patient ill    []  Conflicting appointment    [] No transportation      [] Conflict with work    [x] No reason given    [] Weather related    [] [id number]    [] Other:      Comments:        [x] Next appointment was confirmed    Electronically signed by: Lise Her

## 2020-09-15 ENCOUNTER — HOSPITAL ENCOUNTER (OUTPATIENT)
Dept: PHYSICAL THERAPY | Facility: CLINIC | Age: 25
Setting detail: THERAPIES SERIES
Discharge: HOME | End: 2020-09-15
Payer: COMMERCIAL

## 2020-09-15 PROCEDURE — G0283 ELEC STIM OTHER THAN WOUND: HCPCS

## 2020-09-15 PROCEDURE — 90912 BFB TRAINING 1ST 15 MIN: CPT

## 2020-09-15 PROCEDURE — 97110 THERAPEUTIC EXERCISES: CPT

## 2020-09-15 NOTE — FLOWSHEET NOTE
[] [facility]) [facility] &  [facility]rapy  [address]  [phone]  F: [phone] [] [address]  [facility]   [address]  P: [phone]  F: [phone] [x] [address] &  Therapy  [address] [facility]  P: [phone]  F: [phone] [] [address]  [facility]   [address]   [place]: [phone]  F: [phone]      Physical Therapy Daily Treatment Note    Date:  9/15/2020  Patient Name:  Annalisa Roque    :  1995  MRN: [id number]  Physician: [address]: MMO(unlimited per [place])  Medical Diagnosis: Stress Incontinence of urine                   Rehab Codes: M62.50, N39.3, N94.1  Onset Date: 2020                                  Next 's appt: PRN    Visit# / total visits: 38   Cancels/No Shows:     Subjective:    Pain:  [] Yes  [x] No Location:  N/A Pain Rating: (0-10 scale) 0/10  Pain altered Tx:  [x] No  [] Yes  Action:  Comments: Pt states she feels like she is getting a little stronger. States still crosses legs with sneeze as fearful just to use the Knack ex. Objective:  Modalities:   Precautions:  Exercises:  Exercise Reps/ Time Weight/ Level Comments   Pelvic model explanation         Urine stop test         PF ex: long and quick         Th knack ex         Bridges w/ PF 20x     3 way clam w/ PF/t-band 20x          Treatment Charges: Mins Units   [x]  Modalities: ES 15 1   [x]  Ther Exercise 10 1   []  Manual Therapy     []  Ther Activities     []  Aquatics     []  Vasocompression     [x]  Other: BF 15 1   Total Treatment time 40 3       Assessment: [x] Progressing toward goals. [] No change. [x] Other: Hooked pt up to biofeedback with internal sensor. Pt with good resting tone.  Noted hold time of about 3-4 seconds then muscular reengagement to attempt to maintain the contraction. Good control noted. Able to isolate PF musculature. Worked with [employer] for improved understanding of force closure, hold time and control. Improved understanding after. Continued with ES for PF after biofeedback for increased motor unit recruitment for strength improvement. 15 min with 5:10 ratio. Pt assisted with contraction or last 10 minutes of the full cycle. Instructed in bridges and 3 way clam shell t-band ex with PF. Handout provided. Reviewed HEP. Will have pt return in about 3-4 weeks for a recheck and advancement. STG: (to be met in 4 treatments)  1. Able to isolate PF musculature  2. ? Strength:PF 3/5  3. ? Function: No reports of leaking with cough, sneeze or laugh. 4. Patient to be independent with home exercise program as demonstrated by performance with correct form without cues. LTG: (to be met in 8 treatments)  1. PF 4/5  2. Able to resume intercourse without pain or difficulty. 3. Able to perform all advanced ADL's without leaking     Patient goals: Strengthen pelvic floor/leakage       Pt. Education:  [x] Yes  [] No  [x] Reviewed Prior HEP/Ed  Method of Education: [x] Verbal  [x] Demo  [x] Written(bridges and clam shell w/PF ex)  Comprehension of Education:  [x] Verbalizes understanding. [x] Demonstrates understanding. [] Needs review. [] Demonstrates/verbalizes HEP/Ed previously given. Plan: [x] Continue current frequency toward long and short term goals.     [x] Specific Instructions for subsequent treatments: Biofeedback, ES      Time In: 1000           Time Out: 0804    Electronically signed by:  Carolin Guerrero, PT

## 2020-09-23 ENCOUNTER — TELEPHONE (OUTPATIENT)
Dept: OBGYN CLINIC | Age: 25
End: 2020-09-23

## 2020-10-13 ENCOUNTER — HOSPITAL ENCOUNTER (OUTPATIENT)
Dept: PHYSICAL THERAPY | Facility: CLINIC | Age: 25
Setting detail: THERAPIES SERIES
Discharge: HOME | End: 2020-10-13
Payer: COMMERCIAL

## 2020-10-19 ENCOUNTER — HOSPITAL ENCOUNTER (OUTPATIENT)
Age: 25
Discharge: HOME | End: 2020-10-19
Payer: COMMERCIAL

## 2020-10-19 PROCEDURE — U0003 INFECTIOUS AGENT DETECTION BY NUCLEIC ACID (DNA OR RNA); SEVERE ACUTE RESPIRATORY SYNDROME CORONAVIRUS 2 (SARS-COV-2) (CORONAVIRUS DISEASE [COVID-19]), AMPLIFIED PROBE TECHNIQUE, MAKING USE OF HIGH THROUGHPUT TECHNOLOGIES AS DESCRIBED BY CMS-2020-01-R: HCPCS

## 2020-10-21 LAB — SARS-COV-2, NAA: NOT DETECTED

## 2021-03-30 ENCOUNTER — HOSPITAL ENCOUNTER (OUTPATIENT)
Dept: PHYSICAL THERAPY | Facility: CLINIC | Age: 26
Setting detail: THERAPIES SERIES
Discharge: HOME | End: 2021-03-30
Payer: MEDICARE

## 2021-03-30 NOTE — FLOWSHEET NOTE
[] [facility]) [facility] &  Therapy  [address]    [phone]  F: [phone]   [] [address]  [facility] 36   [address]  P: [phone]  F: [phone]  [] [address] &  Therapy  [address] [facility]  P: [phone]  F: [phone] [] [address] [employer]  P: [phone]  F: [phone]  [] [address]  [facility]   [address]   [place]: [phone]  F: [phone]   [] [address]  [place]: [phone]   F: [phone]     Physical Therapy Cancel/No Show note    Date: 3/30/2021  Patient: Vignesh Vitale  : 1995  MRN: [id number]    Cancels/No Shows to date:     For today's appointment patient:    [x]  Cancelled    [] Rescheduled appointment    [] No-show     Reason given by patient:    []  Patient ill    []  Conflicting appointment    [] No transportation      [] Conflict with work    [] No reason given    [] Weather related    [] [id number]    [x] Other:      Comments:  jpt called to r/s      [] Next appointment was confirmed    Electronically signed by: Juan Olvera

## 2021-04-09 ENCOUNTER — HOSPITAL ENCOUNTER (OUTPATIENT)
Dept: PHYSICAL THERAPY | Facility: CLINIC | Age: 26
Setting detail: THERAPIES SERIES
Discharge: HOME | End: 2021-04-09
Payer: MEDICARE

## 2021-04-09 PROCEDURE — G0283 ELEC STIM OTHER THAN WOUND: HCPCS

## 2021-04-09 PROCEDURE — 90912 BFB TRAINING 1ST 15 MIN: CPT

## 2021-05-03 ENCOUNTER — HOSPITAL ENCOUNTER (OUTPATIENT)
Dept: PHYSICAL THERAPY | Facility: CLINIC | Age: 26
Setting detail: THERAPIES SERIES
Discharge: HOME | End: 2021-05-03
Payer: MEDICARE

## 2021-05-13 NOTE — PROGRESS NOTES
and updated as needed with any new issues today. Return in about 4 weeks (around 2/13/2020) for OB visit with Midwives/glucola. Patient was seen with total face to face time of 15 minutes. More than 50% of this visit was for counseling and education regarding encounter diagnoses and all of the above.     Electronically signed by: BRENDAN Velez CNM show

## 2021-05-14 ENCOUNTER — OFFICE VISIT (OUTPATIENT)
Dept: OBGYN CLINIC | Age: 26
End: 2021-05-14
Payer: MEDICARE

## 2021-05-14 ENCOUNTER — HOSPITAL ENCOUNTER (OUTPATIENT)
Age: 26
Setting detail: SPECIMEN
Discharge: HOME | End: 2021-05-14
Payer: MEDICARE

## 2021-05-14 VITALS
DIASTOLIC BLOOD PRESSURE: 89 MMHG | HEIGHT: 69 IN | WEIGHT: 223.2 LBS | BODY MASS INDEX: 33.06 KG/M2 | SYSTOLIC BLOOD PRESSURE: 127 MMHG | HEART RATE: 68 BPM

## 2021-05-14 DIAGNOSIS — R30.0 DYSURIA: ICD-10-CM

## 2021-05-14 DIAGNOSIS — N89.8 VAGINAL DISCHARGE: Primary | ICD-10-CM

## 2021-05-14 DIAGNOSIS — N89.8 VAGINAL DISCHARGE: ICD-10-CM

## 2021-05-14 DIAGNOSIS — Z11.3 SCREENING FOR STD (SEXUALLY TRANSMITTED DISEASE): ICD-10-CM

## 2021-05-14 PROCEDURE — G8417 CALC BMI ABV UP PARAM F/U: HCPCS | Performed by: ADVANCED PRACTICE MIDWIFE

## 2021-05-14 PROCEDURE — 99213 OFFICE O/P EST LOW 20 MIN: CPT | Performed by: ADVANCED PRACTICE MIDWIFE

## 2021-05-14 PROCEDURE — G8427 DOCREV CUR MEDS BY ELIG CLIN: HCPCS | Performed by: ADVANCED PRACTICE MIDWIFE

## 2021-05-14 PROCEDURE — 1036F TOBACCO NON-USER: CPT | Performed by: ADVANCED PRACTICE MIDWIFE

## 2021-05-14 NOTE — PROGRESS NOTES
to time, place, person andsituation. There are no mood or affect changes. ASSESSMENT/PLAN:  .1. Vaginal discharge  Await results to treat  - Vaginitis DNA Probe; Future    2. Dysuria  - Await results to treat  - Chlamydia/GC DNA, Urine; Future  - Culture, Urine; Future    3. Screening for STD (sexually transmitted disease)  -Await results to treat  - Vaginitis DNA Probe; Future  - Culture, Urine; Future      Patient was seen with total face to face time of 20 minutes. More than 50% of this visit was on counseling and education regarding her   1. Vaginal discharge    2. Dysuria    3. Screening for STD (sexually transmitted disease)     and her options. She was alsocounseled on her preventative health maintenance recommendations and follow-up.

## 2021-05-15 ENCOUNTER — PATIENT MESSAGE (OUTPATIENT)
Dept: OBGYN CLINIC | Age: 26
End: 2021-05-15

## 2021-05-15 DIAGNOSIS — N76.0 GARDNERELLA ASSOCIATED VAGINAL DISCHARGE: Primary | ICD-10-CM

## 2021-05-15 DIAGNOSIS — B96.89 GARDNERELLA ASSOCIATED VAGINAL DISCHARGE: Primary | ICD-10-CM

## 2021-05-15 LAB
CULTURE: NORMAL
DIRECT EXAM: ABNORMAL
Lab: ABNORMAL
Lab: NORMAL
SPECIMEN DESCRIPTION: ABNORMAL
SPECIMEN DESCRIPTION: NORMAL

## 2021-05-15 RX ORDER — METRONIDAZOLE 7.5 MG/G
GEL VAGINAL
Qty: 1 TUBE | Refills: 1 | Status: SHIPPED | OUTPATIENT
Start: 2021-05-15 | End: 2021-05-22

## 2021-05-15 ASSESSMENT — ENCOUNTER SYMPTOMS
RESPIRATORY NEGATIVE: 1
GASTROINTESTINAL NEGATIVE: 1

## 2021-05-17 LAB
C. TRACHOMATIS DNA ,URINE: NEGATIVE
N. GONORRHOEAE DNA, URINE: NEGATIVE
SPECIMEN DESCRIPTION: NORMAL

## 2022-08-30 ENCOUNTER — HOSPITAL ENCOUNTER (OUTPATIENT)
Age: 27
Setting detail: SPECIMEN
Discharge: HOME | End: 2022-08-30

## 2022-08-30 ENCOUNTER — OFFICE VISIT (OUTPATIENT)
Dept: OBGYN CLINIC | Age: 27
End: 2022-08-30
Payer: MEDICARE

## 2022-08-30 VITALS
DIASTOLIC BLOOD PRESSURE: 88 MMHG | BODY MASS INDEX: 34.08 KG/M2 | WEIGHT: 230.1 LBS | SYSTOLIC BLOOD PRESSURE: 130 MMHG | HEIGHT: 69 IN

## 2022-08-30 DIAGNOSIS — B96.89 BV (BACTERIAL VAGINOSIS): ICD-10-CM

## 2022-08-30 DIAGNOSIS — N92.0 MENORRHAGIA WITH REGULAR CYCLE: ICD-10-CM

## 2022-08-30 DIAGNOSIS — Z30.44 ENCOUNTER FOR SURVEILLANCE OF VAGINAL RING HORMONAL CONTRACEPTIVE DEVICE: ICD-10-CM

## 2022-08-30 DIAGNOSIS — N76.0 BV (BACTERIAL VAGINOSIS): ICD-10-CM

## 2022-08-30 DIAGNOSIS — N89.8 VAGINAL DISCHARGE: ICD-10-CM

## 2022-08-30 DIAGNOSIS — N89.8 VAGINAL DISCHARGE: Primary | ICD-10-CM

## 2022-08-30 PROBLEM — Z3A.37 37 WEEKS GESTATION OF PREGNANCY: Status: RESOLVED | Noted: 2020-05-19 | Resolved: 2022-08-30

## 2022-08-30 PROBLEM — O09.299 HISTORY OF MACROSOMIA IN INFANT IN PRIOR PREGNANCY, CURRENTLY PREGNANT: Status: RESOLVED | Noted: 2020-01-02 | Resolved: 2022-08-30

## 2022-08-30 PROBLEM — Z91.89 AT RISK FOR DEPRESSION: Status: RESOLVED | Noted: 2020-01-03 | Resolved: 2022-08-30

## 2022-08-30 PROCEDURE — 99214 OFFICE O/P EST MOD 30 MIN: CPT | Performed by: ADVANCED PRACTICE MIDWIFE

## 2022-08-30 PROCEDURE — G8427 DOCREV CUR MEDS BY ELIG CLIN: HCPCS | Performed by: ADVANCED PRACTICE MIDWIFE

## 2022-08-30 PROCEDURE — 1036F TOBACCO NON-USER: CPT | Performed by: ADVANCED PRACTICE MIDWIFE

## 2022-08-30 PROCEDURE — G8419 CALC BMI OUT NRM PARAM NOF/U: HCPCS | Performed by: ADVANCED PRACTICE MIDWIFE

## 2022-08-30 RX ORDER — METRONIDAZOLE 500 MG/1
500 TABLET ORAL 2 TIMES DAILY
Qty: 14 TABLET | Refills: 0 | Status: SHIPPED | OUTPATIENT
Start: 2022-08-30 | End: 2022-09-06

## 2022-08-30 RX ORDER — ETONOGESTREL AND ETHINYL ESTRADIOL 11.7; 2.7 MG/1; MG/1
1 INSERT, EXTENDED RELEASE VAGINAL
Qty: 3 EACH | Refills: 3 | Status: SHIPPED | OUTPATIENT
Start: 2022-08-30

## 2022-08-30 ASSESSMENT — ENCOUNTER SYMPTOMS
ALLERGIC/IMMUNOLOGIC NEGATIVE: 1
RESPIRATORY NEGATIVE: 1
GASTROINTESTINAL NEGATIVE: 1
EYES NEGATIVE: 1

## 2022-08-30 NOTE — PROGRESS NOTES
Subjective:  Chief Complaint   Patient presents with    Vaginal Odor    Vaginal Discharge     HPI:  Selena Roth is a 31 y.o. female who arrives to office as an established patient for vaginal symptoms. Mariposa Hernandez reports vaginal discharge, odor, and some pelvic cramping x 4-5 days. Denies irritation. She reports a history of BV in the past but it has been a while. She has 1 male partner and they do not use condoms. Would like STI screening today. Mariposa Hernandez was on Nuvaring in the past for heavy/painful periods and would like to re-start. Menses are monthly. Patient's last menstrual period was 08/15/2022. She has had unprotected intercourse since then. Review of Systems   Constitutional: Negative. Negative for chills, fatigue, fever and unexpected weight change. HENT: Negative. Eyes: Negative. Respiratory: Negative. Cardiovascular: Negative. Gastrointestinal: Negative. Endocrine: Negative. Negative for cold intolerance and heat intolerance. Genitourinary:  Positive for menstrual problem (cramping), pelvic pain (mild cramps) and vaginal discharge (odor). Negative for dyspareunia, dysuria, flank pain, frequency and vaginal pain. Musculoskeletal: Negative. Skin: Negative. Allergic/Immunologic: Negative. Neurological: Negative. Hematological: Negative. Psychiatric/Behavioral: Negative. Objective:  Vitals:    08/30/22 1138   BP: 130/88   Weight: 230 lb 1.6 oz (104.4 kg)   Height: 5' 9\" (1.753 m)      Body mass index is 33.98 kg/m². Patient's last menstrual period was 08/15/2022. Current Outpatient Medications on File Prior to Visit   Medication Sig Dispense Refill    acetaminophen (TYLENOL) 325 MG tablet Take 650 mg by mouth every 6 hours as needed for Pain       No current facility-administered medications on file prior to visit.       Past Medical History:   Diagnosis Date    Anemia     w/ preg #2; oral Fe supplement    Depression     Preeclampsia 5/19/2020     Last PAP was normal; October/2019. Physical Exam  Vitals reviewed. Constitutional:       General: She is not in acute distress. Appearance: Normal appearance. She is normal weight. She is not ill-appearing, toxic-appearing or diaphoretic. Cardiovascular:      Rate and Rhythm: Normal rate and regular rhythm. Heart sounds: Normal heart sounds. No murmur heard. Pulmonary:      Effort: Pulmonary effort is normal. No respiratory distress. Breath sounds: Normal breath sounds. Genitourinary:     General: Normal vulva. Exam position: Supine. Labia:         Right: No tenderness or lesion. Left: No tenderness or lesion. Vagina: Vaginal discharge (white, moderate) present. No tenderness, bleeding or lesions. Cervix: No friability, lesion or erythema. Lymphadenopathy:      Lower Body: No right inguinal adenopathy. No left inguinal adenopathy. Skin:     General: Skin is warm and dry. Neurological:      Mental Status: She is alert and oriented to person, place, and time. Mental status is at baseline. Psychiatric:         Mood and Affect: Mood normal.         Behavior: Behavior normal.         Thought Content: Thought content normal.         Judgment: Judgment normal.        Chaperone for Intimate Exam  Chaperone was offered as part of the rooming process. Patient declined and agrees to continue with exam without a chaperone. Assessment/Plan:    Vaginal discharge  Suspect to be BV, will send treatment. Reviewed vaginal hygiene. Vaginitis DNA Probe; Future  Chlamydia Trachomatis & Neisseria gonorrhoeae (GC) by amplified detection; Future  Will treat any other positive findings    BV (bacterial vaginosis)  metroNIDAZOLE (FLAGYL) 500 MG tablet;  Take 1 tablet by mouth 2 times daily for 7 days  Reviewed possible irritants to avoid     Encounter for surveillance of vaginal ring hormonal contraceptive device, Menorrhagia with regular cycle  etonogestrel-ethinyl estradiol (NUVARING) 0.12-0.015 MG/24HR vaginal ring; Place 1 each vaginally every 21 days Insert one (1) ring vaginally and leave in place for three (3) weeks, then remove for one (1) week. Reinforced waiting until next period (start day) to initiate Nuvaring  Discussed Nuvaring correct use and side effects. To report ACHEs  She was instructed to use barrier protection for STI prevention at all times. RTO for annual/follow up       Return for Annual exam.    Problem list reviewed and updated as indicated. Upon completion of the visit all questions were answered. History was reviewed as documented on Epic Navigator. The patient, Elvis Ruff,  was seen with a total time spent of 32 minutes for the visit on this date of service by the [facility]  The time component involved both face-to-face (counseling and education) and non face-to-face time (care coordination), spent in determining the total time component.

## 2022-08-31 LAB
C TRACH DNA GENITAL QL NAA+PROBE: NEGATIVE
CANDIDA SPECIES, DNA PROBE: NEGATIVE
GARDNERELLA VAGINALIS, DNA PROBE: NEGATIVE
N. GONORRHOEAE DNA: NEGATIVE
SOURCE: NORMAL
SPECIMEN DESCRIPTION: NORMAL
TRICHOMONAS VAGINALIS DNA: NEGATIVE

## 2022-09-18 NOTE — PROGRESS NOTES
[facility]  [facility]  [address] [place] [address]  [address] [address]  Dept: [phone]  Dept Fax: [phone]    Tank Fernandes is a 21 y.o. female who presents today for her medical conditions/complaints of   Chief Complaint   Patient presents with    Chills    Headache    Cough    Generalized Body Aches    Pharyngitis          HPI:     /88 (Site: Left Upper Arm, Position: Sitting)   Pulse 74   Temp 97.8 °F (36.6 °C) (Temporal)   Ht 5' 9\" (1.753 m)   Wt 218 lb (98.9 kg)   LMP 06/25/2020 (Exact Date)   SpO2 98%   BMI 32.19 kg/m²       HPI  Pt presented to the urgent care today with c/o chills. This is a new problem. The current episode started 3 days ago. The problem has been unchanged since onset. Associated symptoms include: headache, cough- dry, sore throat,chest tightness, body aches. Pertinent negatives include: No fever, loss of taste/smell, SOB, diarrhea . Pt has tried tylenol with some improvement. Employed at [address] [employer] at [employer]- has been off work. No known exposure to COVID     Past Medical History:   Diagnosis Date    Anemia     w/ preg #2; oral Fe supplement    Depression     Preeclampsia 5/19/2020        History reviewed. No pertinent surgical history. Family History   Problem Relation Age of Onset    Diabetes type 2  Maternal Grandmother         pre diabetic    Colon Cancer Maternal Grandfather     Kidney Disease Father     Hypertension Father     Hypertension Mother     No Known Problems Brother     Hypertension Sister     No Known Problems Sister     Breast Cancer Maternal Aunt        Social History     Tobacco Use    Smoking status: Never Smoker    Smokeless tobacco: Never Used   Substance Use Topics    Alcohol use: Not Currently        Prior to Visit Medications    Medication Sig Taking?  Authorizing Provider   butalbital-acetaminophen-caffeine (FIORICET, ESGIC) -40 MG per tablet Take 1 tablet by mouth every 4 hours as needed for Headaches Yes BRENDAN Rey CNM   etonogestrel-ethinyl estradiol (NUVARING) 0.12-0.015 MG/24HR vaginal ring Place 1 each vaginally every 21 days Insert one (1) ring vaginally and leave in place for three (3) weeks, then remove for one (1) week. Yes BRENDAN Rey CNM   labetalol (NORMODYNE) 200 MG tablet Take 1 tablet by mouth 2 times daily Yes BRENDAN Dorado CNM   ferrous sulfate (FE TABS) 325 (65 Fe) MG EC tablet Take 1 tablet by mouth daily (with breakfast) Yes Devonte Rivas DO   acetaminophen (TYLENOL) 325 MG tablet Take 650 mg by mouth every 6 hours as needed for Pain Yes Historical Provider, MD   Prenatal Vit-Fe Fumarate-FA (PRENATAL VITAMIN PO) Take by mouth Yes Historical Provider, MD   hydrocortisone (ANUSOL-HC) 2.5 % CREA rectal cream Apply twice daily. Patient not taking: Reported on 6/12/2020  BRENDAN Rey CNM   ibuprofen (ADVIL;MOTRIN) 800 MG tablet Take 1 tablet by mouth every 8 hours  Patient not taking: Reported on 6/30/2020  Devonte Rivas DO   docusate sodium (COLACE, DULCOLAX) 100 MG CAPS Take 100 mg by mouth 2 times daily  Patient not taking: Reported on 7/16/2020  Devonte Rivas DO   famotidine (PEPCID) 40 MG tablet Take 40 mg by mouth as needed   Historical Provider, MD       Allergies   Allergen Reactions    Nickel Rash     Rash           Subjective:      Review of Systems   Constitutional: Positive for chills. Negative for fever. HENT: Positive for sore throat (scratchy). Negative for congestion, ear pain, rhinorrhea, trouble swallowing and voice change. Eyes: Negative for pain, redness and visual disturbance. Respiratory: Positive for cough and chest tightness. Negative for shortness of breath. Cardiovascular: Negative for chest pain, palpitations and leg swelling. Gastrointestinal: Negative for abdominal distention, abdominal pain, diarrhea, nausea and vomiting.    Genitourinary: Negative for decreased urine volume and difficulty urinating. Musculoskeletal: Positive for arthralgias and myalgias. Negative for gait problem and neck pain. Skin: Negative for pallor and rash. Neurological: Positive for headaches. Negative for weakness and light-headedness. Psychiatric/Behavioral: Negative for sleep disturbance. Objective:     Physical Exam  Vitals signs and nursing note reviewed. Constitutional:       General: She is not in acute distress. Appearance: Normal appearance. HENT:      Head: Normocephalic and atraumatic. Right Ear: Tympanic membrane and ear canal normal.      Left Ear: Tympanic membrane and ear canal normal.      Nose: Nose normal. No rhinorrhea. Mouth/Throat:      Lips: Pink. Mouth: Mucous membranes are moist.      Pharynx: Oropharynx is clear. Uvula midline. No oropharyngeal exudate or posterior oropharyngeal erythema. Eyes:      Extraocular Movements: Extraocular movements intact. Conjunctiva/sclera: Conjunctivae normal.      Pupils: Pupils are equal, round, and reactive to light. Neck:      Musculoskeletal: Normal range of motion and neck supple. Cardiovascular:      Rate and Rhythm: Normal rate and regular rhythm. Pulses: Normal pulses. Pulmonary:      Effort: Pulmonary effort is normal. No tachypnea. Breath sounds: Normal breath sounds. No wheezing, rhonchi or rales. Abdominal:      General: Bowel sounds are normal. There is no distension. Palpations: Abdomen is soft. Tenderness: There is no abdominal tenderness. Musculoskeletal: Normal range of motion. Right lower leg: No edema. Left lower leg: No edema. Lymphadenopathy:      Cervical: No cervical adenopathy. Skin:     General: Skin is warm and dry. Capillary Refill: Capillary refill takes less than 2 seconds. Findings: No rash. Neurological:      Mental Status: She is alert and oriented to person, place, and time.       Coordination: Coordination normal.      Gait: Gait normal.   Psychiatric:         Mood and Affect: Mood normal.         Thought Content: Thought content normal.           MEDICAL DECISION MAKING Assessment/Plan:     Martina Olvera was seen today for chills, headache, cough, generalized body aches and pharyngitis. Diagnoses and all orders for this visit:    Suspected COVID-19 virus infection  -     COVID-19 Ambulatory; Future        Results for orders placed or performed during the hospital encounter of 06/12/20   VAGINITIS DNA PROBE    Specimen: Vaginal   Result Value Ref Range    Specimen Description . VAGINA     Special Requests NOT REPORTED     Direct Exam POSITIVE for Gardnerella vaginalis. (A)     Direct Exam NEGATIVE for Candida sp. Direct Exam NEGATIVE for Trichomonas vaginalis     Direct Exam       Method of testing is a DNA probe intended for detection and identification of Candida species, Gardnerella vaginalis, and Trichomonas vaginalis nucleic acid in vaginal fluid specimens from patients with symptoms of vaginitis/vaginosis. Based on the history and exam-   Will send out COVID19 testing. Possible treatment alterations based on the results. Patient instructed to self-quarantine until testing results are back- and to follow the quarantine instructions in the after visit summary. Even if results are negative- pt informed still needs to isolate for at least 10 days. Tylenol as needed for fever/pain. Increase fluids, rest.   The patient indicates understanding of these issues and agrees with the plan. Educational materials provided on AVS.  Follow up if symptoms do not improve/worsen. Call with any questions or concerns. Discussed symptoms that will warrant urgent ED evaluation/treatment. Preventing the Spread of Coronavirus Disease 2019 in Homes and Residential Communities:   For the most recent information go to: [employer]    Patient given educational materials - see patientinstructions. Discussed use, benefit, and side effects of prescribed medications. All patient questions answered. Pt verbalized understanding. Instructed to continue current medications, diet and exercise. Patient agreedwith treatment plan. Follow up as directed.      Electronically signed by BRENDAN Darden CNP on 7/16/2020 at 3:33 PM none

## 2024-05-08 ENCOUNTER — HOSPITAL ENCOUNTER (OUTPATIENT)
Dept: PHYSICAL THERAPY | Facility: CLINIC | Age: 29
Setting detail: THERAPIES SERIES
Discharge: HOME | End: 2024-05-08

## 2024-05-08 NOTE — FLOWSHEET NOTE
[] [facility]  Outpatient Rehabilitation &  Therapy  [address]  [phone]  [phone] [] [facility]  Outpatient Rehabilitation &  Therapy  [address]  P: [phone]  F: [phone] [x] [facility]  Outpatient Rehabilitation &  Therapy  [address]  P: [phone]  F: [phone] [] [facility]  Outpatient Rehabilitation &  Therapy  [address]  [phone]  [phone] [] [facility]  Outpatient Rehabilitation &  Therapy  [address]   P: [phone]  F: [phone]  [] [facility]  Outpatient Rehabilitation &  Therapy  [address]  P: [phone]  F: [phone] [] [facility]  Outpatient Rehabilitation &  Therapy  [address]  P: [phone]  F: [phone] [] [facility]  Outpatient Rehabilitation &  Therapy  [address]  P: [phone]  F: [phone] [] [facility]  Outpatient Rehabilitation &  Therapy  [address]  P: [phone]  F: [phone]  [] [facility] Outpatient Rehabilitation &  Therapy  [address]  P: [phone]  F: [phone]     Therapy Cancel/No Show note    Date: 2024  Patient: Anson Walters  : 1995  MRN: [id number]    Cancels/No Shows to date: 1 CX    For today's appointment patient:    [x]  Cancelled    [] Rescheduled appointment    [] No-show     Reason given by patient:    []  Patient ill    []  Conflicting appointment    [] No transportation      [] Conflict with work    [x] No reason given    [] Weather related    [] COVID-19    [] Other:      Comments:        [x] Next appointment was confirmed 24    Electronically signed by: Jennifer Rodriguez, PT

## 2024-05-16 ENCOUNTER — HOSPITAL ENCOUNTER (OUTPATIENT)
Dept: PHYSICAL THERAPY | Facility: CLINIC | Age: 29
Setting detail: THERAPIES SERIES
Discharge: HOME | End: 2024-05-16
Payer: MEDICAID

## 2024-05-16 PROCEDURE — 97161 PT EVAL LOW COMPLEX 20 MIN: CPT

## 2024-05-16 PROCEDURE — 97530 THERAPEUTIC ACTIVITIES: CPT

## 2024-05-16 NOTE — CONSULTS
[address] [] Dry Needling, 3 or more muscles  [address]     Frequency:  1 x/week for 10 visits    Today’s Treatment:  Modalities:   Precautions:  Exercises:   Exercise Reps/ Time Weight/ Level Comments   Piriformis S      Hip flexor S            Bridges       Clamshells      Sl hip abduction             TA set      PF endurance holds      PF quick flicks             Education  15'  -Educated patient on pelvic anatomy and function of the pelvic floor  -Educated patient on how pregnancy and delivery can affect the pelvic floor  -Educated patient on pelvic organ prolapse and how prolapse can increase pelvic pain and pelvic pressure  -Educated patient on how pelvic organ prolapse can increase tension within the pelvic floor   -Educated patient on importance of pelvic floor strengthening in order to improve stability to pelvic organs  -Educated patient on importance of reducing increased pressure within pelvic region when lifting objects                 Specific Instructions for next treatment: Progress pelvic floor, hip and core strength     Evaluation Complexity:  History (Personal factors, comorbidities) [x] 0 [] 1-2 [] 3+   Exam (limitations, restrictions) [x] 1-2 [] 3 [] 4+   Clinical presentation (progression) [x] Stable [] Evolving  [] Unstable   Decision Making [x] Low [] Moderate [] High    [x] Low Complexity [] Moderate Complexity [] High Complexity       Treatment Charges: Mins Units   [x] Evaluation       [x]  Low       []  Moderate       []  High 30 1   []  Modalities:     []  Ther Exercise     []  Manual Therapy     [x]  Ther Activities 15 1   []  Aquatics     [] Vasocompression     []  Other       TOTAL TREATMENT TIME: 45 min      Time in: 10:15 am      Time out: 11:00 am       Electronically signed by: La Dvais PT      Physician Signature:________________________________Date:__________________  By signing above or cosigning this note, I have reviewed this plan of care and certify a need for medically

## 2024-05-24 ENCOUNTER — HOSPITAL ENCOUNTER (OUTPATIENT)
Dept: PHYSICAL THERAPY | Facility: CLINIC | Age: 29
Setting detail: THERAPIES SERIES
Discharge: HOME | End: 2024-05-24
Payer: MEDICAID

## 2024-05-24 PROCEDURE — 97110 THERAPEUTIC EXERCISES: CPT

## 2024-05-24 NOTE — FLOWSHEET NOTE
Action:  Comments: Pt reports L hip pain today.  Occ UI.  Pt arrives 25 min late for appt.  Able to accommodate.    Objective:  Modalities:   Precautions:  Exercises:  Exercise Reps/ Time Weight/ Level Comments   MET 5\"x10  L post innominate    PRC ex 20     Piriformis S  10\"x5       Hip flexor S  10\"x5    off EOB             Bridges   10    TA+PF   Clamshells-- 3-way 10  lime  TA+PF   Sl hip abduction                    TA set  5\"x10       PF endurance holds  5\"x10       PF quick flicks  2\"x10        the knack X     Functional ex with pelvic brace HO X  Edu on pelvic brace with sit to stand, squats, lunge, overhead reach.     Other:      Treatment Charges: Mins Units   []  Modalities     [x]  Ther Exercise 35 2   []  Manual Therapy     []  Ther Activities     []  Neuro Re-ed     []  Vasocompression     [] Gait     []  Other     Total Billable time 35 2       Assessment: [x] Progressing toward goals.  Pt cont with SIJ malalignment today.  Corrected with MET followed by stretching.  HO given for MET.  Cont with core and PF strengthening ex per flow sheet with HO given for all.  Discussed the knack and functional ex, movements with pelvic brace for stab and to help avoid leaks.  Pt with good understanding.  Will cont to progress ex as pt sherif.    [] No change.     [] Other:  [x] Patient would continue to benefit from skilled physical therapy services in order to: increase strength and function    STG: (to be met in 5 treatments)  Patient will report pain as 1/10  Patient will improve lumbar AROM to 100% within all planes in order to increase ease of performing ADLs  ? Strength: Patient will demonstrate proper TA/PF activation in supine with correct breathing in order to improve core/pelvic floor stability   ? Function: Patient will demonstrate 1 cm perineal body descent without use of accessory muscles in order to improve relaxation of the pelvic floor  Patient will demonstrate equal pelvic alignment   Independent with

## 2024-05-29 ENCOUNTER — HOSPITAL ENCOUNTER (OUTPATIENT)
Dept: PHYSICAL THERAPY | Facility: CLINIC | Age: 29
Setting detail: THERAPIES SERIES
Discharge: HOME | End: 2024-05-29
Payer: MEDICAID

## 2024-05-29 PROCEDURE — 97110 THERAPEUTIC EXERCISES: CPT

## 2024-05-29 NOTE — FLOWSHEET NOTE
physical therapy services in order to: increase strength and function    STG: (to be met in 5 treatments)  Patient will report pain as 1/10  Patient will improve lumbar AROM to 100% within all planes in order to increase ease of performing ADLs  ? Strength: Patient will demonstrate proper TA/PF activation in supine with correct breathing in order to improve core/pelvic floor stability   ? Function: Patient will demonstrate 1 cm perineal body descent without use of accessory muscles in order to improve relaxation of the pelvic floor  Patient will demonstrate equal pelvic alignment   Independent with Home Exercise Programs  LTG: (to be met in 10 treatments)  Patient will improve TOYA-6 and POPDI-6 to 0% and PPIQ to 5/32 in order to indicate improved overall quality of life   Patient will improve diastasis recti to 0 finger widths in order to reduce tension on pelvic floor and indicate improved core strength    Patient will demonstrate ability to lift up to 25lbs with good core/pelvic floor stability and good breathing technique in order to increase ease of performing care taking tasks   Patient will report reduced BETTY symptoms with use of knack technique   Patient will report pain as 0/10    Pt. Education:  [x] Yes  [] No  [] Reviewed Prior HEP/Ed  Method of Education: [x] Verbal  [x] Demo  [] Written  Comprehension of Education:  [x] Verbalizes understanding.  [x] Demonstrates understanding.  [x] Needs review.  [] Demonstrates/verbalizes HEP/Ed previously given.     Plan: [x] Continue current frequency toward long and short term goals.    [x] Specific Instructions for subsequent treatments: Progress pelvic floor, hip and core strength       Time In: 10:15 am            Time Out: 11:00 am    Electronically signed by:  La Davis PT

## 2024-06-06 ENCOUNTER — HOSPITAL ENCOUNTER (OUTPATIENT)
Dept: PHYSICAL THERAPY | Facility: CLINIC | Age: 29
Setting detail: THERAPIES SERIES
Discharge: HOME | End: 2024-06-06

## 2024-06-06 NOTE — FLOWSHEET NOTE
[] [facility]  Outpatient Rehabilitation &  Therapy  [address]  [phone]  [phone] [] [facility]  Outpatient Rehabilitation &  Therapy  [address]  P: [phone]  F: [phone] [x] [facility]  Outpatient Rehabilitation &  Therapy  [address]  P: [phone]  F: [phone] [] [facility]  Outpatient Rehabilitation &  Therapy  [address]  [phone]  [phone] [] [facility]  Outpatient Rehabilitation &  Therapy  [address]   P: [phone]  F: [phone]  [] [facility]  Outpatient Rehabilitation &  Therapy  [address]  P: [phone]  F: [phone] [] [facility]  Outpatient Rehabilitation &  Therapy  [address]  P: [phone]  F: [phone] [] [facility]  Outpatient Rehabilitation &  Therapy  [address]  P: [phone]  F: [phone] [] [facility]  Outpatient Rehabilitation &  Therapy  [address]  P: [phone]  F: [phone]  [] [facility] Outpatient Rehabilitation &  Therapy  [address]  P: [phone]  F: [phone]     Therapy Cancel/No Show note    Date: 2024  Patient: Anson Walters  : 1995  MRN: [id number]    Cancels/No Shows to date: 1 CX    For today's appointment patient:    [x]  Cancelled    [] Rescheduled appointment    [] No-show     Reason given by patient:    [x]  Patient ill    []  Conflicting appointment    [] No transportation      [] Conflict with work    [] No reason given    [] Weather related    [] COVID-19    [] Other:      Comments:        [] Next appointment was confirmed     Electronically signed by: Verónica Corona PTA

## 2024-06-13 ENCOUNTER — HOSPITAL ENCOUNTER (OUTPATIENT)
Dept: PHYSICAL THERAPY | Facility: CLINIC | Age: 29
Setting detail: THERAPIES SERIES
Discharge: HOME | End: 2024-06-13
Payer: MEDICAID

## 2024-06-13 PROCEDURE — 97110 THERAPEUTIC EXERCISES: CPT

## 2024-06-13 NOTE — FLOWSHEET NOTE
[] [facility]  Outpatient Rehabilitation &  Therapy  [address]  [phone]  [phone] [] [facility]  Outpatient Rehabilitation &  Therapy  [address]  P: [phone]  F: [phone] [x] [facility]  Outpatient Rehabilitation &  Therapy  [address]  P: [phone]  F: [phone] [] [facility]  Outpatient Rehabilitation &  Therapy  [address]  [phone]  [phone] [] [facility]  Outpatient Rehabilitation &  Therapy  [address]   P: [phone]  F: [phone]  [] [facility]  Outpatient Rehabilitation &  Therapy  [address]  P: [phone]  F: [phone] [] [facility]  Outpatient Rehabilitation &  Therapy  [address]  P: [phone]  F: [phone] [] [facility]  Outpatient Rehabilitation &  Therapy  [address]  P: [phone]  F: [phone] [] [facility]  Outpatient Rehabilitation &  Therapy  [address]  P: [phone]  F: [phone]  [] [facility] Outpatient Rehabilitation &  Therapy  [address]  P: [phone]  F: [phone]     Physical Therapy Daily Treatment Note    Date:  2024  Patient Name:  Anson Walters    :  1995  MRN: [id number]  Physician: Evelyn Little MD                 Insurance: [facility] Medicaid, visits  no pt liab, auth after 30th visit   Medical Diagnosis: R10.2 (ICD-10-CM) - Pelvic and perineal pain                             Rehab Codes: N81.84, R10.2, R27.8, M62.81, M62.08    Onset Date: 24                        Next 's appt: PRN       Visit# / total visits:     Cancels/No Shows:     Subjective:    Pain:  [x] Yes  [] No Location: L hip  Pain Rating: (0-10 scale) 0/10  Pain altered Tx:  [] No  [] Yes

## 2024-06-21 ENCOUNTER — HOSPITAL ENCOUNTER (OUTPATIENT)
Dept: PHYSICAL THERAPY | Facility: CLINIC | Age: 29
Setting detail: THERAPIES SERIES
Discharge: HOME | End: 2024-06-21
Payer: MEDICAID

## 2024-06-21 NOTE — FLOWSHEET NOTE
[] [facility]  Outpatient Rehabilitation &  Therapy  [address]  [phone]  [phone] [] [facility]  Outpatient Rehabilitation &  Therapy  [address]  P: [phone]  F: [phone] [x] [facility]  Outpatient Rehabilitation &  Therapy  [address]  P: [phone]  F: [phone] [] [facility]  Outpatient Rehabilitation &  Therapy  [address]  [phone]  [phone] [] [facility]  Outpatient Rehabilitation &  Therapy  [address]   P: [phone]  F: [phone]  [] [facility]  Outpatient Rehabilitation &  Therapy  [address]  P: [phone]  F: [phone] [] [facility]  Outpatient Rehabilitation &  Therapy  [address]  P: [phone]  F: [phone] [] [facility]  Outpatient Rehabilitation &  Therapy  [address]  P: [phone]  F: [phone] [] [facility]  Outpatient Rehabilitation &  Therapy  [address]  P: [phone]  F: [phone]  [] [facility] Outpatient Rehabilitation &  Therapy  [address]  P: [phone]  F: [phone]     Therapy Cancel/No Show note    Date: 2024  Patient: Anson Walters  : 1995  MRN: [id number]    Cancels/No Shows to date: 2 CX    For today's appointment patient:    [x]  Cancelled    [] Rescheduled appointment    [] No-show     Reason given by patient:    []  Patient ill    []  Conflicting appointment    [x] No transportation      [] Conflict with work    [] No reason given    [] Weather related    [] COVID-19    [] Other:      Comments:        [] Next appointment was confirmed     Electronically signed by: Verónica Corona PTA

## 2024-06-25 ENCOUNTER — HOSPITAL ENCOUNTER (OUTPATIENT)
Dept: PHYSICAL THERAPY | Facility: CLINIC | Age: 29
Setting detail: THERAPIES SERIES
Discharge: HOME | End: 2024-06-25
Payer: MEDICAID

## 2024-06-25 PROCEDURE — 97110 THERAPEUTIC EXERCISES: CPT

## 2024-06-25 NOTE — FLOWSHEET NOTE
[] [facility]  Outpatient Rehabilitation &  Therapy  [address]  [phone]  [phone] [] [facility]  Outpatient Rehabilitation &  Therapy  [address]  P: [phone]  F: [phone] [x] [facility]  Outpatient Rehabilitation &  Therapy  [address]  P: [phone]  F: [phone] [] [facility]  Outpatient Rehabilitation &  Therapy  [address]  [phone]  [phone] [] [facility]  Outpatient Rehabilitation &  Therapy  [address]   P: [phone]  F: [phone]  [] [facility]  Outpatient Rehabilitation &  Therapy  [address]  P: [phone]  F: [phone] [] [facility]  Outpatient Rehabilitation &  Therapy  [address]  P: [phone]  F: [phone] [] [place]  Outpatient Rehabilitation &  Therapy  [address]  P: [phone]  F: [phone] [] [facility]  Outpatient Rehabilitation &  Therapy  [address]  P: [phone]  F: [phone]  [] [facility] Outpatient Rehabilitation &  Therapy  [address]  P: [phone]  F: [phone]     Physical Therapy Daily Treatment Note    Date:  2024  Patient Name:  Anson Walters    :  1995  MRN: [id number]  Physician: Evelyn Little MD                 Insurance: [facility] Medicaid, visits  no pt liab, auth after 30th visit   Medical Diagnosis: R10.2 (ICD-10-CM) - Pelvic and perineal pain                             Rehab Codes: N81.84, R10.2, R27.8, M62.81, M62.08    Onset Date: 24                        Next 's appt: PRN       Visit# / total visits:     Cancels/No Shows:     Subjective:    Pain:  [x] Yes  [] No Location: L hip  Pain Rating: (0-10 scale) 0/10  Pain altered Tx:  [] No  [] Yes

## 2024-07-02 ENCOUNTER — HOSPITAL ENCOUNTER (OUTPATIENT)
Dept: PHYSICAL THERAPY | Facility: CLINIC | Age: 29
Setting detail: THERAPIES SERIES
Discharge: HOME | End: 2024-07-02

## 2024-07-02 NOTE — FLOWSHEET NOTE
[] [facility]  Outpatient Rehabilitation &  Therapy  [address]  [phone]  [phone] [] [facility]  Outpatient Rehabilitation &  Therapy  [address]  P: [phone]  F: [phone] [x] [facility]  Outpatient Rehabilitation &  Therapy  [address]  P: [phone]  F: [phone] [] [facility]  Outpatient Rehabilitation &  Therapy  [address]  [phone]  [phone] [] [facility]  Outpatient Rehabilitation &  Therapy  [address]   P: [phone]  F: [phone]  [] [facility]  Outpatient Rehabilitation &  Therapy  [address]  P: [phone]  F: [phone] [] [facility]  Outpatient Rehabilitation &  Therapy  [address]  P: [phone]  F: [phone] [] [facility]  Outpatient Rehabilitation &  Therapy  [address]  P: [phone]  F: [phone] [] [facility]  Outpatient Rehabilitation &  Therapy  [address]  P: [phone]  F: [phone]  [] [facility] Outpatient Rehabilitation &  Therapy  [address]  P: [phone]  F: [phone]     Therapy Cancel/No Show note    Date: 2024  Patient: Anson Walters  : 1995  MRN: [id number]    Cancels/No Shows to date: 4 CX    For today's appointment patient:    [x]  Cancelled    [] Rescheduled appointment    [] No-show     Reason given by patient:    []  Patient ill    []  Conflicting appointment    [] No transportation      [] Conflict with work    [x] No reason given    [] Weather related    [] COVID-19    [x] Other:      Comments: will call back to r/s      [] Next appointment was confirmed     Electronically signed by: Verónica Corona PTA

## 2024-07-12 ENCOUNTER — HOSPITAL ENCOUNTER (OUTPATIENT)
Dept: PHYSICAL THERAPY | Facility: CLINIC | Age: 29
Setting detail: THERAPIES SERIES
Discharge: HOME | End: 2024-07-12
Payer: MEDICAID

## 2024-07-12 PROCEDURE — 97110 THERAPEUTIC EXERCISES: CPT

## 2024-07-12 NOTE — FLOWSHEET NOTE
[] [facility]  Outpatient Rehabilitation &  Therapy  [address]  [phone]  [phone] [] [facility]  Outpatient Rehabilitation &  Therapy  [address]  P: [phone]  F: [phone] [x] [facility]  Outpatient Rehabilitation &  Therapy  [address]  P: [phone]  F: [phone] [] [facility]  Outpatient Rehabilitation &  Therapy  [address]  [phone]  [phone] [] [facility]  Outpatient Rehabilitation &  Therapy  [address]   P: [phone]  F: [phone]  [] [facility]  Outpatient Rehabilitation &  Therapy  [address]  P: [phone]  F: [phone] [] [facility]  Outpatient Rehabilitation &  Therapy  [address]  P: [phone]  F: [phone] [] [facility]  Outpatient Rehabilitation &  Therapy  [address]  P: [phone]  F: [phone] [] [facility]  Outpatient Rehabilitation &  Therapy  [address]  P: [phone]  F: [phone]  [] [facility] Outpatient Rehabilitation &  Therapy  [address]  P: [phone]  F: [phone]     Physical Therapy Daily Treatment Note    Date:  2024  Patient Name:  Anson Walters    :  1995  MRN: [id number]  Physician: Evelyn Little MD                 Insurance: [facility] Medicaid, visits  no pt liab, auth after 30th visit   Medical Diagnosis: R10.2 (ICD-10-CM) - Pelvic and perineal pain                             Rehab Codes: N81.84, R10.2, R27.8, M62.81, M62.08    Onset Date: 24                        Next 's appt: PRN       Visit# / total visits:     Cancels/No Shows:     Subjective:    Pain:  [x] Yes  [] No Location: L hip  Pain Rating: (0-10 scale) 0/10  Pain altered Tx:  [] No  [] Yes

## 2024-07-16 ENCOUNTER — HOSPITAL ENCOUNTER (OUTPATIENT)
Dept: PHYSICAL THERAPY | Facility: CLINIC | Age: 29
Setting detail: THERAPIES SERIES
Discharge: HOME | End: 2024-07-16
Payer: MEDICAID

## 2024-07-16 NOTE — FLOWSHEET NOTE
[] [facility]  Outpatient Rehabilitation &  Therapy  [address]  [phone]  [phone] [] [facility]  Outpatient Rehabilitation &  Therapy  [address]  P: [phone]  F: [phone] [x] [facility]  Outpatient Rehabilitation &  Therapy  [address]  P: [phone]  F: [phone] [] [facility]  Outpatient Rehabilitation &  Therapy  [address]  [phone]  [phone] [] [facility]  Outpatient Rehabilitation &  Therapy  [address]   P: [phone]  F: [phone]  [] [facility]  Outpatient Rehabilitation &  Therapy  [address]  P: [phone]  F: [phone] [] [facility]  Outpatient Rehabilitation &  Therapy  [address]  P: [phone]  F: [phone] [] [facility]  Outpatient Rehabilitation &  Therapy  [address]  P: [phone]  F: [phone] [] [facility]  Outpatient Rehabilitation &  Therapy  [address]  P: [phone]  F: [phone]  [] [facility] Outpatient Rehabilitation &  Therapy  [address]  P: [phone]  F: [phone]     Therapy Cancel/No Show note    Date: 2024  Patient: Anson Walters  : 1995  MRN: [id number]    Cancels/No Shows to date: 5 CX    For today's appointment patient:    [x]  Cancelled    [] Rescheduled appointment    [] No-show     Reason given by patient:    []  Patient ill    []  Conflicting appointment    [] No transportation      [] Conflict with work    [x] No reason given    [] Weather related    [] COVID-19    [] Other:      Comments:       [x] Next appointment was confirmed     Electronically signed by: La [place] PT

## 2024-08-08 ENCOUNTER — CLINICAL DOCUMENTATION (OUTPATIENT)
Dept: PHYSICAL THERAPY | Facility: CLINIC | Age: 29
End: 2024-08-08

## 2024-08-08 NOTE — FLOWSHEET NOTE
[] [facility]  Outpatient Rehabilitation &  Therapy  [address]  [phone]  [phone] [] [facility]  Outpatient Rehabilitation &  Therapy  [address]  P: [phone]  F: [phone] [] [facility]  Outpatient Rehabilitation &  Therapy  [address]  P: [phone]  F: [phone] [] [facility]  Outpatient Rehabilitation &  Therapy  [address]  [phone]  [phone] [] [facility]  Outpatient Rehabilitation &  Therapy  [address]   P: [phone]  F: [phone]  [] [facility]  Outpatient Rehabilitation &  Therapy  [address]  P: [phone]  F: [phone] [] [facility]  Outpatient Rehabilitation &  Therapy  [address]  P: [phone]  F: [phone] [] [facility]  Outpatient Rehabilitation &  Therapy  [address]  P: [phone]  F: [phone] [] [facility]  Outpatient Rehabilitation &  Therapy  [address]  P: [phone]  F: [phone]  [] [facility] Outpatient Rehabilitation &  Therapy  [address]  P: [phone]  F: [phone]     Physical Therapy Discharge Note    Date: 2024      Patient: Anson Walters  : 1995  MRN: [id number]    Physician: Evelyn Little MD                 Insurance: [employer] Medicaid, visits  no pt liab, auth after 30th visit   Medical Diagnosis: R10.2 (ICD-10-CM) - Pelvic and perineal pain                             Rehab Codes: N81.84, R10.2, R27.8, M62.81, M62.08    Onset Date: 24                        Next 's appt: PRN                               Visit# / total visits:      Cancels/No shows:   Date of initial visit: 24                Date of final visit: 24      Subjective:  Pain:  [x] Yes